# Patient Record
Sex: MALE | Race: WHITE | NOT HISPANIC OR LATINO | Employment: FULL TIME | URBAN - METROPOLITAN AREA
[De-identification: names, ages, dates, MRNs, and addresses within clinical notes are randomized per-mention and may not be internally consistent; named-entity substitution may affect disease eponyms.]

---

## 2018-11-14 ENCOUNTER — TRANSCRIBE ORDERS (OUTPATIENT)
Dept: LAB | Facility: HOSPITAL | Age: 63
End: 2018-11-14

## 2018-11-14 ENCOUNTER — LAB (OUTPATIENT)
Dept: LAB | Facility: HOSPITAL | Age: 63
End: 2018-11-14

## 2018-11-14 DIAGNOSIS — R10.10 UPPER ABDOMINAL PAIN: Primary | ICD-10-CM

## 2018-11-14 DIAGNOSIS — R19.4 CHANGE IN BOWEL HABITS: ICD-10-CM

## 2018-11-14 DIAGNOSIS — R10.10 UPPER ABDOMINAL PAIN: ICD-10-CM

## 2018-11-14 LAB — C DIFF TOX GENS STL QL NAA+PROBE: NOT DETECTED

## 2018-11-14 PROCEDURE — 87177 OVA AND PARASITES SMEARS: CPT

## 2018-11-14 PROCEDURE — 87045 FECES CULTURE AEROBIC BACT: CPT

## 2018-11-14 PROCEDURE — 87209 SMEAR COMPLEX STAIN: CPT

## 2018-11-14 PROCEDURE — 87493 C DIFF AMPLIFIED PROBE: CPT

## 2018-11-14 PROCEDURE — 87046 STOOL CULTR AEROBIC BACT EA: CPT

## 2018-11-14 PROCEDURE — 87338 HPYLORI STOOL AG IA: CPT

## 2018-11-16 LAB — BACTERIA SPEC AEROBE CULT: NORMAL

## 2018-11-17 LAB — H PYLORI AG STL QL IA: POSITIVE

## 2018-11-23 LAB
O+P SPEC MICRO: NORMAL
O+P STL TRI STN: NORMAL

## 2022-10-18 ENCOUNTER — OFFICE VISIT (OUTPATIENT)
Dept: FAMILY MEDICINE CLINIC | Facility: CLINIC | Age: 67
End: 2022-10-18

## 2022-10-18 VITALS
WEIGHT: 156.4 LBS | DIASTOLIC BLOOD PRESSURE: 80 MMHG | HEIGHT: 69 IN | BODY MASS INDEX: 23.16 KG/M2 | RESPIRATION RATE: 16 BRPM | SYSTOLIC BLOOD PRESSURE: 128 MMHG | TEMPERATURE: 97.7 F | OXYGEN SATURATION: 97 % | HEART RATE: 63 BPM

## 2022-10-18 DIAGNOSIS — Z23 NEED FOR COVID-19 VACCINE: ICD-10-CM

## 2022-10-18 DIAGNOSIS — G89.29 CHRONIC MIDLINE BACK PAIN, UNSPECIFIED BACK LOCATION: ICD-10-CM

## 2022-10-18 DIAGNOSIS — Z23 NEED FOR VACCINATION FOR H FLU TYPE B: ICD-10-CM

## 2022-10-18 DIAGNOSIS — M54.9 CHRONIC MIDLINE BACK PAIN, UNSPECIFIED BACK LOCATION: ICD-10-CM

## 2022-10-18 DIAGNOSIS — E55.9 VITAMIN D DEFICIENCY: ICD-10-CM

## 2022-10-18 DIAGNOSIS — M1A.9XX0 CHRONIC GOUT WITHOUT TOPHUS, UNSPECIFIED CAUSE, UNSPECIFIED SITE: ICD-10-CM

## 2022-10-18 DIAGNOSIS — Z00.00 ROUTINE GENERAL MEDICAL EXAMINATION AT A HEALTH CARE FACILITY: Primary | ICD-10-CM

## 2022-10-18 DIAGNOSIS — K76.89: ICD-10-CM

## 2022-10-18 DIAGNOSIS — K83.9 BILE DUCT ABNORMALITY: ICD-10-CM

## 2022-10-18 DIAGNOSIS — Z12.11 SCREENING FOR COLON CANCER: ICD-10-CM

## 2022-10-18 DIAGNOSIS — R68.89 FORGETFULNESS: ICD-10-CM

## 2022-10-18 DIAGNOSIS — E03.9 ACQUIRED HYPOTHYROIDISM: ICD-10-CM

## 2022-10-18 PROCEDURE — G0008 ADMIN INFLUENZA VIRUS VAC: HCPCS | Performed by: NURSE PRACTITIONER

## 2022-10-18 PROCEDURE — 91312 COVID-19 (PFIZER) BIVALENT BOOSTER 12+YRS: CPT | Performed by: NURSE PRACTITIONER

## 2022-10-18 PROCEDURE — 90662 IIV NO PRSV INCREASED AG IM: CPT | Performed by: NURSE PRACTITIONER

## 2022-10-18 PROCEDURE — 2014F MENTAL STATUS ASSESS: CPT | Performed by: NURSE PRACTITIONER

## 2022-10-18 PROCEDURE — 99387 INIT PM E/M NEW PAT 65+ YRS: CPT | Performed by: NURSE PRACTITIONER

## 2022-10-18 PROCEDURE — 3008F BODY MASS INDEX DOCD: CPT | Performed by: NURSE PRACTITIONER

## 2022-10-18 PROCEDURE — 0124A PR ADM SARSCOV2 30MCG/0.3ML BST: CPT | Performed by: NURSE PRACTITIONER

## 2022-10-18 RX ORDER — MV-MN/C/THEANINE/HERB NO.310 1000-200MG
1 POWDER IN PACKET (EA) ORAL NIGHTLY PRN
COMMUNITY

## 2022-10-18 RX ORDER — DOCUSATE CALCIUM 240 MG
240 CAPSULE ORAL 2 TIMES DAILY PRN
COMMUNITY

## 2022-10-18 RX ORDER — DICYCLOMINE HCL 20 MG
20 TABLET ORAL 3 TIMES DAILY
COMMUNITY
Start: 2022-08-15 | End: 2022-10-18 | Stop reason: SDUPTHER

## 2022-10-18 RX ORDER — DICYCLOMINE HCL 20 MG
20 TABLET ORAL 3 TIMES DAILY
Qty: 270 TABLET | Refills: 2 | Status: SHIPPED | OUTPATIENT
Start: 2022-10-18

## 2022-10-18 RX ORDER — PANTOPRAZOLE SODIUM 40 MG/1
40 TABLET, DELAYED RELEASE ORAL 2 TIMES DAILY
COMMUNITY
Start: 2022-08-30

## 2022-10-18 RX ORDER — ALLOPURINOL 100 MG/1
100 TABLET ORAL 2 TIMES DAILY
Qty: 180 TABLET | Refills: 2 | Status: SHIPPED | OUTPATIENT
Start: 2022-10-18

## 2022-10-18 NOTE — PROGRESS NOTES
New Patient History and Physical      Referring Physician: No ref. provider found    Chief Complaint:    Chief Complaint   Patient presents with   • Thyroid Problem   • Gout   • Irritable Bowel Syndrome     ESTABLISH CARE        History of Present Illness:   Juan Sherman is a 67 y.o. male who presents today as a new patient. Recently moved to area from [place]. Was seen by [facility] in [place], by Dr. Shyam Castillo. Patient works as a farmer. Is accompanied today by wife. Acute concerns today of Cough x 2 weeks  Productive cough, improving recently with mucinex all in one cough syrup.     Patient reports that he recently started taking Neuriva brain performance supplements with Vitamin B6, B12, and folate. States that he started to notice he was forgetting things more frequently and thinks it is just typical age-related memory loss.     Gout  Takes allopurinol to prevent flares.     Had a kidney stone in the past but that was years ago.    GERD  Well-controlled with Protonix twice daily (before breakfast and dinner).     Hypothyroidism  Levothyroxine 100mcg. Has not had thyroid level checked in several months.     Gallstones  Patient states that he was told that his bile duct had not formed properly--causing stricture.    Patient smoked when younger but quit in 1974. Denies alcohol use.     Subjective     Review of Systems     Constitutional: Negative for fever. Negative for chills, diaphoresis, fatigue and unexpected weight change.   HENT: No dysphagia; no changes to vision/hearing/smell/taste; no epistaxis  Eyes: Negative for redness and visual disturbance.   Respiratory: productive cough  Cardiovascular: Negative for chest pain and palpitations.   Gastrointestinal: Negative for abdominal distention, abdominal pain and blood in stool.   Endocrine: Negative for cold intolerance and heat intolerance.   Genitourinary: Negative for difficulty urinating, dysuria and frequency.   Musculoskeletal: Negative for  arthralgias, back pain and myalgias.   Skin: Negative for color change, rash and wound.   Neurological: Negative for syncope, weakness and headaches.   Hematological: Negative for adenopathy. Does not bruise/bleed easily.   Psychiatric/Behavioral: Negative for confusion. The patient is not nervous/anxious.     The following portions of the patient's history were reviewed and updated as appropriate: allergies, current medications, past family history, past medical history, past social history, past surgical history and problem list.    Past Medical History:   Past Medical History:   Diagnosis Date   • Disease of thyroid gland    • GERD (gastroesophageal reflux disease)    • Gout    • Hyperlipidemia    • Hypertension    • MRSA (methicillin resistant Staphylococcus aureus)     of foot 4 years ago       Past Surgical History:  Past Surgical History:   Procedure Laterality Date   • FOOT IRRIGATION, DEBRIDEMENT AND REPAIR     • HYDROCELECTOMY         Family History: family history includes Arthritis in his sister; Cancer in his sister; Diabetes in his brother, mother, and sister; Heart attack in his mother.    Social History:  reports that he quit smoking about 45 years ago. His smoking use included cigarettes. He has never used smokeless tobacco. He reports current alcohol use of about 1.0 standard drink per week. He reports that he does not use drugs.    Medications:    Current Outpatient Medications:   •  albuterol (PROVENTIL HFA;VENTOLIN HFA) 108 (90 BASE) MCG/ACT inhaler, Inhale 2 puffs every 4 (four) hours as needed for wheezing., Disp: 1 inhaler, Rfl: 0  •  allopurinol (ZYLOPRIM) 100 MG tablet, Take 1 tablet by mouth 2 (Two) Times a Day., Disp: 180 tablet, Rfl: 2  •  Cobalamin Combinations (Neuriva Plus) capsule, Take  by mouth., Disp: , Rfl:   •  dicyclomine (BENTYL) 20 MG tablet, Take 1 tablet by mouth 3 (Three) Times a Day., Disp: 270 tablet, Rfl: 2  •  docusate calcium (SURFAK) 240 MG capsule, Take 1 capsule by  "mouth 2 (Two) Times a Day., Disp: , Rfl:   •  fluticasone (FLONASE) 50 MCG/ACT nasal spray, 2 sprays into each nostril As Needed. Administer 2 sprays in each nostril for each dose. , Disp: , Rfl:   •  levothyroxine (SYNTHROID, LEVOTHROID) 88 MCG tablet, Take 100 mcg by mouth Daily., Disp: , Rfl:   •  pantoprazole (PROTONIX) 40 MG EC tablet, Take 1 tablet by mouth 2 (Two) Times a Day., Disp: , Rfl:   •  vitamin D (CHOLECALCIFEROL) 400 UNITS tablet, Take 400 Units by mouth daily., Disp: , Rfl:     Allergies:  Allergies   Allergen Reactions   • Lipitor [Atorvastatin] Other (See Comments)     MAKES HIM SORE AND STIFF      Lab Results   Component Value Date    WBC 5.71 10/18/2022    HGB 13.6 10/18/2022    HCT 39.8 10/18/2022    MCV 96.4 10/18/2022     10/18/2022     Lab Results   Component Value Date    GLUCOSE 103 (H) 10/18/2022    BUN 17 10/18/2022    CREATININE 0.95 10/18/2022    BCR 17.9 10/18/2022    K 4.5 10/18/2022    CO2 28.1 10/18/2022    CALCIUM 9.4 10/18/2022    PROTENTOTREF 6.5 10/18/2022    ALBUMIN 4.80 10/18/2022    LABIL2 2.8 10/18/2022    AST 22 10/18/2022    ALT 16 10/18/2022     Lab Results   Component Value Date    TSH 2.080 10/18/2022     Lab Results   Component Value Date    CHLPL 213 (H) 10/18/2022    TRIG 159 (H) 10/18/2022    HDL 41 10/18/2022     (H) 10/18/2022       Objective     Vital Signs:   /80   Pulse 63   Temp 97.7 °F (36.5 °C)   Resp 16   Ht 175.3 cm (69\")   Wt 70.9 kg (156 lb 6.4 oz)   SpO2 97%   BMI 23.10 kg/m²      Physical Exam:  Physical Exam  General Appearance: alert, oriented x 3, no acute distress.  Skin: warm and dry.   HEENT: Atraumatic.  pupils round and reactive to light and accommodation, oral mucosa pink and moist.  Nares patent without epistaxis.  External auditory canals are patent tympanic membranes intact.  Neck: supple, no JVD, trachea midline.  No thyromegaly  Lungs: CTA, unlabored breathing effort.  Heart: RRR, normal S1 and S2, no S3, no " rub.  Abdomen: soft, non-tender, no palpable bladder, present bowel sounds to auscultation ×4.  No guarding or rigidity.  Extremities: no clubbing, cyanosis or edema.  Good range of motion actively and passively.  Symmetric muscle strength and development  Neuro: normal speech and mental status.  Cranial nerves II through XII intact.  No anosmia. DTR 2+; proprioception intact.  No focal motor/sensory deficits.    Assessment / Plan     Assessment/Plan:   Diagnoses and all orders for this visit:    1. Routine general medical examination at a health care facility (Primary)  -     Hepatitis C antibody  -     CBC and Differential  -     Comprehensive metabolic panel  -     Lipid panel  -     Vitamin D 25 hydroxy    2. Vitamin D deficiency  -     Vitamin D 25 hydroxy    3. Acquired hypothyroidism  -     TSH  -     T4, free    4. Screening for colon cancer  -     Ambulatory Referral For Screening Colonoscopy    5. Bile back-up  -     Ambulatory Referral to Gastroenterology    6. Bile duct abnormality  -     Ambulatory Referral to Gastroenterology    7. Chronic midline back pain, unspecified back location  -     XR Spine Lumbar 2 or 3 View; Future  -     XR Spine Cervical 2 or 3 View; Future  -     XR Spine Thoracic 3 View; Future    8. Chronic gout without tophus, unspecified cause, unspecified site  -     allopurinol (ZYLOPRIM) 100 MG tablet; Take 1 tablet by mouth 2 (Two) Times a Day.  Dispense: 180 tablet; Refill: 2    9. Need for COVID-19 vaccine  -     COVID-19 Bivalent Booster (Pfizer) 12+yrs    10. Need for vaccination for H flu type B  -     Fluzone High-Dose 65+yrs ([id number])    11. Forgetfulness    Other orders  -     dicyclomine (BENTYL) 20 MG tablet; Take 1 tablet by mouth 3 (Three) Times a Day.  Dispense: 270 tablet; Refill: 2    Discussion Summary:  Discussed plan of care in detail with pt today; pt verb understanding and agrees.    Follow up:  Return in 3 months (on 1/18/2023).     Patient  Education:  Patient Instructions        Preventive Care 65 Years and Older, Male  Preventive care refers to lifestyle choices and visits with your health care provider that can promote health and wellness. Preventive care visits are also called wellness exams.  What can I expect for my preventive care visit?  Counseling  During your preventive care visit, your health care provider may ask about your:  Medical history, including:  Past medical problems.  Family medical history.  History of falls.  Current health, including:  Emotional well-being.  Home life and relationship well-being.  Sexual activity.  Memory and ability to understand (cognition).  Lifestyle, including:  Alcohol, nicotine or tobacco, and drug use.  Access to firearms.  Diet, exercise, and sleep habits.  Work and work environment.  Sunscreen use.  Safety issues such as seatbelt and bike helmet use.  Physical exam  Your health care provider will check your:  Height and weight. These may be used to calculate your BMI (body mass index). BMI is a measurement that tells if you are at a healthy weight.  Waist circumference. This measures the distance around your waistline. This measurement also tells if you are at a healthy weight and may help predict your risk of certain diseases, such as type 2 diabetes and high blood pressure.  Heart rate and blood pressure.  Body temperature.  Skin for abnormal spots.  What immunizations do I need?  Vaccines are usually given at various ages, according to a schedule. Your health care provider will recommend vaccines for you based on your age, medical history, and lifestyle or other factors, such as travel or where you work.  What tests do I need?  Screening  Your health care provider may recommend screening tests for certain conditions. This may include:  Lipid and cholesterol levels.  Diabetes screening. This is done by checking your blood sugar (glucose) after you have not eaten for a while (fasting).  Hepatitis C  test.  Hepatitis B test.  HIV (human immunodeficiency virus) test.  STI (sexually transmitted infection) testing, if you are at risk.  Lung cancer screening.  Colorectal cancer screening.  Prostate cancer screening.  Abdominal aortic aneurysm (AAA) screening. You may need this if you are a current or former smoker.  Talk with your health care provider about your test results, treatment options, and if necessary, the need for more tests.  Follow these instructions at home:  Eating and drinking  Eat a diet that includes fresh fruits and vegetables, whole grains, lean protein, and low-fat dairy products. Limit your intake of foods with high amounts of sugar, saturated fats, and salt.  Take vitamin and mineral supplements as recommended by your health care provider.  Do not drink alcohol if your health care provider tells you not to drink.  If you drink alcohol:  Limit how much you have to 0-2 drinks a day.  Know how much alcohol is in your drink. In the [place] one drink equals one 12 oz bottle of beer (355 mL), one 5 oz glass of wine (148 mL), or one 1½ oz glass of hard liquor (44 mL).  Lifestyle  Brush your teeth every morning and night with fluoride toothpaste. Floss one time each day.  Exercise for at least 30 minutes 5 or more days each week.  Do not use any products that contain nicotine or tobacco. These products include cigarettes, chewing tobacco, and vaping devices, such as e-cigarettes. If you need help quitting, ask your health care provider.  Do not use drugs.  If you are sexually active, practice safe sex. Use a condom or other form of protection to prevent STIs.  Take aspirin only as told by your health care provider. Make sure that you understand how much to take and what form to take. Work with your health care provider to find out whether it is safe and beneficial for you to take aspirin daily.  Ask your health care provider if you need to take a cholesterol-lowering medicine (statin).  Find healthy  ways to manage stress, such as:  Meditation, yoga, or listening to music.  Journaling.  Talking to a trusted person.  Spending time with friends and family.  Safety  Always wear your seat belt while driving or riding in a vehicle.  Do not drive:  If you have been drinking alcohol. Do not ride with someone who has been drinking.  When you are tired or distracted.  While texting.  If you have been using any mind-altering substances or drugs.  Wear a helmet and other protective equipment during sports activities.  If you have firearms in your house, make sure you follow all gun safety procedures.  Minimize exposure to UV radiation to reduce your risk of skin cancer.  What's next?  Visit your health care provider once a year for an annual wellness visit.  Ask your health care provider how often you should have your eyes and teeth checked.  Stay up to date on all vaccines.  This information is not intended to replace advice given to you by your health care provider. Make sure you discuss any questions you have with your health care provider.  Document Revised: 06/15/2022 Document Reviewed: 06/15/2022  [employer] Patient Education © 2022 [employer]          DIANA Hussein  10/18/2022      Please note that portions of this note may have been completed with a voice recognition program. Efforts were made to edit the dictations, but occasionally words are mistranscribed.

## 2022-10-19 LAB
25(OH)D3+25(OH)D2 SERPL-MCNC: 66.2 NG/ML (ref 30–100)
ALBUMIN SERPL-MCNC: 4.8 G/DL (ref 3.5–5.2)
ALBUMIN/GLOB SERPL: 2.8 G/DL
ALP SERPL-CCNC: 76 U/L (ref 39–117)
ALT SERPL-CCNC: 16 U/L (ref 1–41)
AST SERPL-CCNC: 22 U/L (ref 1–40)
BASOPHILS # BLD AUTO: 0.04 10*3/MM3 (ref 0–0.2)
BASOPHILS NFR BLD AUTO: 0.7 % (ref 0–1.5)
BILIRUB SERPL-MCNC: 0.5 MG/DL (ref 0–1.2)
BUN SERPL-MCNC: 17 MG/DL (ref 8–23)
BUN/CREAT SERPL: 17.9 (ref 7–25)
CALCIUM SERPL-MCNC: 9.4 MG/DL (ref 8.6–10.5)
CHLORIDE SERPL-SCNC: 106 MMOL/L (ref 98–107)
CHOLEST SERPL-MCNC: 213 MG/DL (ref 0–200)
CO2 SERPL-SCNC: 28.1 MMOL/L (ref 22–29)
CREAT SERPL-MCNC: 0.95 MG/DL (ref 0.76–1.27)
EGFRCR SERPLBLD CKD-EPI 2021: 87.7 ML/MIN/1.73
EOSINOPHIL # BLD AUTO: 0.07 10*3/MM3 (ref 0–0.4)
EOSINOPHIL NFR BLD AUTO: 1.2 % (ref 0.3–6.2)
ERYTHROCYTE [DISTWIDTH] IN BLOOD BY AUTOMATED COUNT: 12.4 % (ref 12.3–15.4)
GLOBULIN SER CALC-MCNC: 1.7 GM/DL
GLUCOSE SERPL-MCNC: 103 MG/DL (ref 65–99)
HCT VFR BLD AUTO: 39.8 % (ref 37.5–51)
HCV AB S/CO SERPL IA: <0.1 S/CO RATIO (ref 0–0.9)
HDLC SERPL-MCNC: 41 MG/DL (ref 40–60)
HGB BLD-MCNC: 13.6 G/DL (ref 13–17.7)
IMM GRANULOCYTES # BLD AUTO: 0.02 10*3/MM3 (ref 0–0.05)
IMM GRANULOCYTES NFR BLD AUTO: 0.4 % (ref 0–0.5)
LDLC SERPL CALC-MCNC: 143 MG/DL (ref 0–100)
LYMPHOCYTES # BLD AUTO: 1.32 10*3/MM3 (ref 0.7–3.1)
LYMPHOCYTES NFR BLD AUTO: 23.1 % (ref 19.6–45.3)
MCH RBC QN AUTO: 32.9 PG (ref 26.6–33)
MCHC RBC AUTO-ENTMCNC: 34.2 G/DL (ref 31.5–35.7)
MCV RBC AUTO: 96.4 FL (ref 79–97)
MONOCYTES # BLD AUTO: 0.27 10*3/MM3 (ref 0.1–0.9)
MONOCYTES NFR BLD AUTO: 4.7 % (ref 5–12)
NEUTROPHILS # BLD AUTO: 3.99 10*3/MM3 (ref 1.7–7)
NEUTROPHILS NFR BLD AUTO: 69.9 % (ref 42.7–76)
NRBC BLD AUTO-RTO: 0 /100 WBC (ref 0–0.2)
PLATELET # BLD AUTO: 211 10*3/MM3 (ref 140–450)
POTASSIUM SERPL-SCNC: 4.5 MMOL/L (ref 3.5–5.2)
PROT SERPL-MCNC: 6.5 G/DL (ref 6–8.5)
RBC # BLD AUTO: 4.13 10*6/MM3 (ref 4.14–5.8)
SODIUM SERPL-SCNC: 143 MMOL/L (ref 136–145)
T4 FREE SERPL-MCNC: 1.2 NG/DL (ref 0.93–1.7)
TRIGL SERPL-MCNC: 159 MG/DL (ref 0–150)
TSH SERPL DL<=0.005 MIU/L-ACNC: 2.08 UIU/ML (ref 0.27–4.2)
VLDLC SERPL CALC-MCNC: 29 MG/DL (ref 5–40)
WBC # BLD AUTO: 5.71 10*3/MM3 (ref 3.4–10.8)

## 2022-10-24 NOTE — PATIENT INSTRUCTIONS
Preventive Care 65 Years and Older, Male  Preventive care refers to lifestyle choices and visits with your health care provider that can promote health and wellness. Preventive care visits are also called wellness exams.  What can I expect for my preventive care visit?  Counseling  During your preventive care visit, your health care provider may ask about your:  Medical history, including:  Past medical problems.  Family medical history.  History of falls.  Current health, including:  Emotional well-being.  Home life and relationship well-being.  Sexual activity.  Memory and ability to understand (cognition).  Lifestyle, including:  Alcohol, nicotine or tobacco, and drug use.  Access to firearms.  Diet, exercise, and sleep habits.  Work and work environment.  Sunscreen use.  Safety issues such as seatbelt and bike helmet use.  Physical exam  Your health care provider will check your:  Height and weight. These may be used to calculate your BMI (body mass index). BMI is a measurement that tells if you are at a healthy weight.  Waist circumference. This measures the distance around your waistline. This measurement also tells if you are at a healthy weight and may help predict your risk of certain diseases, such as type 2 diabetes and high blood pressure.  Heart rate and blood pressure.  Body temperature.  Skin for abnormal spots.  What immunizations do I need?  Vaccines are usually given at various ages, according to a schedule. Your health care provider will recommend vaccines for you based on your age, medical history, and lifestyle or other factors, such as travel or where you work.  What tests do I need?  Screening  Your health care provider may recommend screening tests for certain conditions. This may include:  Lipid and cholesterol levels.  Diabetes screening. This is done by checking your blood sugar (glucose) after you have not eaten for a while (fasting).  Hepatitis C test.  Hepatitis B test.  HIV (human  immunodeficiency virus) test.  STI (sexually transmitted infection) testing, if you are at risk.  Lung cancer screening.  Colorectal cancer screening.  Prostate cancer screening.  Abdominal aortic aneurysm (AAA) screening. You may need this if you are a current or former smoker.  Talk with your health care provider about your test results, treatment options, and if necessary, the need for more tests.  Follow these instructions at home:  Eating and drinking  Eat a diet that includes fresh fruits and vegetables, whole grains, lean protein, and low-fat dairy products. Limit your intake of foods with high amounts of sugar, saturated fats, and salt.  Take vitamin and mineral supplements as recommended by your health care provider.  Do not drink alcohol if your health care provider tells you not to drink.  If you drink alcohol:  Limit how much you have to 0-2 drinks a day.  Know how much alcohol is in your drink. In the [place] one drink equals one 12 oz bottle of beer (355 mL), one 5 oz glass of wine (148 mL), or one 1½ oz glass of hard liquor (44 mL).  Lifestyle  Brush your teeth every morning and night with fluoride toothpaste. Floss one time each day.  Exercise for at least 30 minutes 5 or more days each week.  Do not use any products that contain nicotine or tobacco. These products include cigarettes, chewing tobacco, and vaping devices, such as e-cigarettes. If you need help quitting, ask your health care provider.  Do not use drugs.  If you are sexually active, practice safe sex. Use a condom or other form of protection to prevent STIs.  Take aspirin only as told by your health care provider. Make sure that you understand how much to take and what form to take. Work with your health care provider to find out whether it is safe and beneficial for you to take aspirin daily.  Ask your health care provider if you need to take a cholesterol-lowering medicine (statin).  Find healthy ways to manage stress, such  as:  Meditation, yoga, or listening to music.  Journaling.  Talking to a trusted person.  Spending time with friends and family.  Safety  Always wear your seat belt while driving or riding in a vehicle.  Do not drive:  If you have been drinking alcohol. Do not ride with someone who has been drinking.  When you are tired or distracted.  While texting.  If you have been using any mind-altering substances or drugs.  Wear a helmet and other protective equipment during sports activities.  If you have firearms in your house, make sure you follow all gun safety procedures.  Minimize exposure to UV radiation to reduce your risk of skin cancer.  What's next?  Visit your health care provider once a year for an annual wellness visit.  Ask your health care provider how often you should have your eyes and teeth checked.  Stay up to date on all vaccines.  This information is not intended to replace advice given to you by your health care provider. Make sure you discuss any questions you have with your health care provider.  Document Revised: 06/15/2022 Document Reviewed: 06/15/2022  [employer] Patient Education © 2022 [employer]

## 2022-11-09 ENCOUNTER — ANCILLARY ORDERS (OUTPATIENT)
Dept: FAMILY MEDICINE CLINIC | Facility: CLINIC | Age: 67
End: 2022-11-09

## 2022-11-09 DIAGNOSIS — M51.34 DEGENERATIVE DISC DISEASE, THORACIC: ICD-10-CM

## 2022-11-09 DIAGNOSIS — G89.29 CHRONIC MIDLINE BACK PAIN, UNSPECIFIED BACK LOCATION: ICD-10-CM

## 2022-11-09 DIAGNOSIS — M54.9 CHRONIC MIDLINE BACK PAIN, UNSPECIFIED BACK LOCATION: ICD-10-CM

## 2022-11-09 DIAGNOSIS — M50.30 DEGENERATIVE DISC DISEASE, CERVICAL: Primary | ICD-10-CM

## 2022-11-09 DIAGNOSIS — M51.36 DEGENERATIVE DISC DISEASE, LUMBAR: ICD-10-CM

## 2023-01-18 ENCOUNTER — LAB (OUTPATIENT)
Dept: LAB | Facility: HOSPITAL | Age: 68
End: 2023-01-18
Payer: MEDICARE

## 2023-01-18 ENCOUNTER — OFFICE VISIT (OUTPATIENT)
Dept: GASTROENTEROLOGY | Facility: CLINIC | Age: 68
End: 2023-01-18
Payer: MEDICARE

## 2023-01-18 VITALS
OXYGEN SATURATION: 99 % | TEMPERATURE: 97.3 F | BODY MASS INDEX: 23.7 KG/M2 | HEIGHT: 69 IN | WEIGHT: 160 LBS | SYSTOLIC BLOOD PRESSURE: 138 MMHG | HEART RATE: 63 BPM | DIASTOLIC BLOOD PRESSURE: 80 MMHG

## 2023-01-18 DIAGNOSIS — K21.9 GASTROESOPHAGEAL REFLUX DISEASE, UNSPECIFIED WHETHER ESOPHAGITIS PRESENT: Chronic | ICD-10-CM

## 2023-01-18 DIAGNOSIS — R10.11 RIGHT UPPER QUADRANT ABDOMINAL PAIN: Chronic | ICD-10-CM

## 2023-01-18 DIAGNOSIS — K83.9 BILE DUCT ABNORMALITY: Chronic | ICD-10-CM

## 2023-01-18 DIAGNOSIS — Z12.11 ENCOUNTER FOR SCREENING FOR MALIGNANT NEOPLASM OF COLON: ICD-10-CM

## 2023-01-18 DIAGNOSIS — R10.11 RIGHT UPPER QUADRANT ABDOMINAL PAIN: Primary | Chronic | ICD-10-CM

## 2023-01-18 DIAGNOSIS — Z80.0 FAMILY HISTORY OF COLON CANCER: ICD-10-CM

## 2023-01-18 DIAGNOSIS — K59.00 CONSTIPATION, UNSPECIFIED CONSTIPATION TYPE: Chronic | ICD-10-CM

## 2023-01-18 LAB
ALBUMIN SERPL-MCNC: 4.5 G/DL (ref 3.5–5.2)
ALBUMIN/GLOB SERPL: 1.6 G/DL
ALP SERPL-CCNC: 61 U/L (ref 39–117)
ALT SERPL W P-5'-P-CCNC: 19 U/L (ref 1–41)
ANION GAP SERPL CALCULATED.3IONS-SCNC: 8.9 MMOL/L (ref 5–15)
AST SERPL-CCNC: 20 U/L (ref 1–40)
BILIRUB SERPL-MCNC: 0.5 MG/DL (ref 0–1.2)
BUN SERPL-MCNC: 31 MG/DL (ref 8–23)
BUN/CREAT SERPL: 22.5 (ref 7–25)
CALCIUM SPEC-SCNC: 9.7 MG/DL (ref 8.6–10.5)
CHLORIDE SERPL-SCNC: 106 MMOL/L (ref 98–107)
CO2 SERPL-SCNC: 28.1 MMOL/L (ref 22–29)
CREAT SERPL-MCNC: 1.38 MG/DL (ref 0.76–1.27)
DEPRECATED RDW RBC AUTO: 42.6 FL (ref 37–54)
EGFRCR SERPLBLD CKD-EPI 2021: 56 ML/MIN/1.73
ERYTHROCYTE [DISTWIDTH] IN BLOOD BY AUTOMATED COUNT: 12.3 % (ref 12.3–15.4)
GLOBULIN UR ELPH-MCNC: 2.8 GM/DL
GLUCOSE SERPL-MCNC: 95 MG/DL (ref 65–99)
HCT VFR BLD AUTO: 41.6 % (ref 37.5–51)
HGB BLD-MCNC: 14.3 G/DL (ref 13–17.7)
IGA1 MFR SER: 146 MG/DL (ref 70–400)
LIPASE SERPL-CCNC: 22 U/L (ref 13–60)
MCH RBC QN AUTO: 32.6 PG (ref 26.6–33)
MCHC RBC AUTO-ENTMCNC: 34.4 G/DL (ref 31.5–35.7)
MCV RBC AUTO: 95 FL (ref 79–97)
PLATELET # BLD AUTO: 196 10*3/MM3 (ref 140–450)
PMV BLD AUTO: 9.2 FL (ref 6–12)
POTASSIUM SERPL-SCNC: 4.9 MMOL/L (ref 3.5–5.2)
PROT SERPL-MCNC: 7.3 G/DL (ref 6–8.5)
RBC # BLD AUTO: 4.38 10*6/MM3 (ref 4.14–5.8)
SODIUM SERPL-SCNC: 143 MMOL/L (ref 136–145)
WBC NRBC COR # BLD: 4.34 10*3/MM3 (ref 3.4–10.8)

## 2023-01-18 PROCEDURE — 99214 OFFICE O/P EST MOD 30 MIN: CPT | Performed by: NURSE PRACTITIONER

## 2023-01-18 PROCEDURE — 82784 ASSAY IGA/IGD/IGG/IGM EACH: CPT

## 2023-01-18 PROCEDURE — 86364 TISS TRNSGLTMNASE EA IG CLAS: CPT

## 2023-01-18 PROCEDURE — 85027 COMPLETE CBC AUTOMATED: CPT

## 2023-01-18 PROCEDURE — 36415 COLL VENOUS BLD VENIPUNCTURE: CPT

## 2023-01-18 PROCEDURE — 83690 ASSAY OF LIPASE: CPT

## 2023-01-18 PROCEDURE — 80053 COMPREHEN METABOLIC PANEL: CPT

## 2023-01-18 RX ORDER — MELATONIN 10 MG
10 CAPSULE ORAL NIGHTLY
COMMUNITY

## 2023-01-18 RX ORDER — SODIUM, POTASSIUM,MAG SULFATES 17.5-3.13G
SOLUTION, RECONSTITUTED, ORAL ORAL
Qty: 177 ML | Refills: 0 | Status: SHIPPED | OUTPATIENT
Start: 2023-01-18 | End: 2023-01-23

## 2023-01-18 RX ORDER — SODIUM CHLORIDE 9 MG/ML
70 INJECTION, SOLUTION INTRAVENOUS CONTINUOUS PRN
Status: CANCELLED | OUTPATIENT
Start: 2023-01-18

## 2023-01-18 RX ORDER — MULTIPLE VITAMINS W/ MINERALS TAB 9MG-400MCG
1 TAB ORAL DAILY
COMMUNITY

## 2023-01-18 RX ORDER — METAXALONE 800 MG/1
800 TABLET ORAL 3 TIMES DAILY PRN
COMMUNITY

## 2023-01-18 NOTE — PROGRESS NOTES
"     New Patient Consult      Date: 2023   Patient Name: Juan Sherman  MRN: [id number]  : 1955     Primary Care Provider: Mami Cheng APRN    Chief Complaint   Patient presents with   • Abdominal Pain     History of Present Illness: Juan Sherman is a 67 y.o. male who is here today to establish care with gastroenterology for abdominal pain.     He has been having pain in the RUQ for the past 2 years. He had an ERCP in  at [facility] and was told his bile duct \"did not form correctly\". He was advised to follow up in 6 months, but he did not go back. He has swelling in the RUQ that comes and goes. He has the pain daily that comes and goes. The pain occurs if he over eats. If he lays on his right side, the pain is worse. Denies nausea or vomiting. Denies melena.     He has a history of reflux for over 30 years. He is currently taking Pantoprazole 40 mg twice a day for a couple of years with reasonable control. If he goes down to once a day, his reflux is severe. He denies difficulty swallowing.     He has a history of constipation off and on for several years that is reasonably controlled with stool softeners. He usually has 1 soft bowel movement per day. Denies rectal bleeding.     His last colonoscopy was about 10 years ago per patient. His last EGD was around . He has a family history of colon cancer in his brother, diagnosed in his 50-60's. He had 2-3 uncles with colon cancer, unknown age at diagnosis.    Subjective      Past Medical History:   Diagnosis Date   • Disease of thyroid gland    • GERD (gastroesophageal reflux disease)    • Gout    • Hyperlipidemia    • Hypertension    • MRSA (methicillin resistant Staphylococcus aureus)     of foot 4 years ago     Past Surgical History:   Procedure Laterality Date   • CHOLECYSTECTOMY     • COLONOSCOPY      over 10 years ago   • ERCP     • FOOT IRRIGATION, DEBRIDEMENT AND REPAIR     • HYDROCELECTOMY     • UPPER GASTROINTESTINAL " ENDOSCOPY       Family History   Problem Relation Age of Onset   • Diabetes Mother    • Heart attack Mother    • Diabetes Sister    • Cancer Sister    • Arthritis Sister    • Diabetes Brother    • Colon cancer Brother         diagnosed in his 50-60's   • Colon cancer Paternal Uncle         2-3 uncles     Social History     Socioeconomic History   • Marital status:    Tobacco Use   • Smoking status: Former     Packs/day: 0.50     Years: 3.00     Pack years: 1.50     Types: Cigarettes     Quit date: 1976     Years since quittin.1   • Smokeless tobacco: Never   Vaping Use   • Vaping Use: Never used   Substance and Sexual Activity   • Alcohol use: Not Currently     Alcohol/week: 1.0 standard drink     Types: 1 Cans of beer per week     Comment: occasionally   • Drug use: No   • Sexual activity: Yes     Partners: Female       Current Outpatient Medications:   •  allopurinol (ZYLOPRIM) 100 MG tablet, Take 1 tablet by mouth 2 (Two) Times a Day., Disp: 180 tablet, Rfl: 2  •  Cobalamin Combinations (Neuriva Plus) capsule, Take  by mouth., Disp: , Rfl:   •  Coenzyme Q10-Vitamin E (QUNOL ULTRA COQ10 PO), Take 100 mg by mouth Daily., Disp: , Rfl:   •  diclofenac (VOLTAREN) 50 MG EC tablet, Take 50 mg by mouth 2 (Two) Times a Day As Needed., Disp: , Rfl:   •  dicyclomine (BENTYL) 20 MG tablet, Take 1 tablet by mouth 3 (Three) Times a Day., Disp: 270 tablet, Rfl: 2  •  docusate calcium (SURFAK) 240 MG capsule, Take 1 capsule by mouth 2 (Two) Times a Day., Disp: , Rfl:   •  fluticasone (FLONASE) 50 MCG/ACT nasal spray, 2 sprays into each nostril As Needed. Administer 2 sprays in each nostril for each dose. , Disp: , Rfl:   •  levothyroxine (SYNTHROID, LEVOTHROID) 88 MCG tablet, Take 100 mcg by mouth Daily., Disp: , Rfl:   •  Melatonin 10 MG capsule, Take 10 mg by mouth Every Night., Disp: , Rfl:   •  metaxalone (SKELAXIN) 800 MG tablet, Take 800 mg by mouth 3 (Three) Times a Day As Needed for Muscle Spasms.,  Disp: , Rfl:   •  multivitamin with minerals (MULTIVITAMIN ADULT PO), Take 1 tablet by mouth Daily., Disp: , Rfl:   •  pantoprazole (PROTONIX) 40 MG EC tablet, Take 1 tablet by mouth 2 (Two) Times a Day., Disp: , Rfl:   •  sodium-potassium-magnesium sulfates (Suprep Bowel Prep Kit) 17.5-3.13-1.6 GM/177ML solution oral solution, Use as directed for colonoscopy prep. Patient has instructions., Disp: 177 mL, Rfl: 0  •  vitamin D (CHOLECALCIFEROL) 400 UNITS tablet, Take 400 Units by mouth daily., Disp: , Rfl:    Allergies   Allergen Reactions   • Lipitor [Atorvastatin] Other (See Comments)     MAKES HIM SORE AND STIFF      The following portions of the patient's history were reviewed and updated as appropriate: allergies, current medications, past family history, past medical history, past social history, past surgical history and problem list.    Objective     Physical Exam  Vitals and nursing note reviewed.   Constitutional:       General: He is not in acute distress.     Appearance: Normal appearance. He is well-developed.   HENT:      Head: Normocephalic and atraumatic.      Mouth/Throat:      Mouth: Mucous membranes are not pale, not dry and not cyanotic.   Eyes:      General: Lids are normal.   Neck:      Trachea: Trachea normal.   Cardiovascular:      Rate and Rhythm: Normal rate.   Pulmonary:      Effort: Pulmonary effort is normal. No respiratory distress.      Breath sounds: Normal breath sounds.   Abdominal:      General: Bowel sounds are normal.      Palpations: Abdomen is soft. There is no mass.      Tenderness: There is no abdominal tenderness.      Hernia: No hernia is present.   Skin:     General: Skin is warm and dry.   Neurological:      Mental Status: He is alert and oriented to person, place, and time.   Psychiatric:         Mood and Affect: Mood normal.         Speech: Speech normal.         Behavior: Behavior normal. Behavior is cooperative.       Vitals:    01/18/23 0843   BP: 138/80   Pulse: 63  "  Temp: 97.3 °F (36.3 °C)   TempSrc: Infrared   SpO2: 99%   Weight: 72.6 kg (160 lb)   Height: 175.3 cm (69\")     Body mass index is 23.63 kg/m².     Results Review:   I have reviewed the patient's new clinical and imaging results.    No visits with results within 90 Day(s) from this visit.   Latest known visit with results is:   Office Visit on 10/18/2022   Component Date Value Ref Range Status   • Hep C Virus Ab 10/18/2022 <0.1  0.0 - 0.9 s/co ratio Final    Comment:                                   Negative:     < 0.8                               Indeterminate: 0.8 - 0.9                                    Positive:     > 0.9   HCV antibody alone does not differentiate between   previous resolved infection and active infection.   The CDC and current clinical guidelines recommend   that a positive HCV antibody result be followed up   with an HCV RNA test to support the diagnosis of   acute HCV infection. [facility] offers Hepatitis C   Virus (HCV) RNA, Diagnosis, VIOLA (542224) and   Hepatitis C Virus (HCV) Antibody with reflex to   Quantitative Real-time PCR (926481).     • WBC 10/18/2022 5.71  3.40 - 10.80 10*3/mm3 Final   • RBC 10/18/2022 4.13 (L)  4.14 - 5.80 10*6/mm3 Final   • Hemoglobin 10/18/2022 13.6  13.0 - 17.7 g/dL Final   • Hematocrit 10/18/2022 39.8  37.5 - 51.0 % Final   • MCV 10/18/2022 96.4  79.0 - 97.0 fL Final   • MCH 10/18/2022 32.9  26.6 - 33.0 pg Final   • MCHC 10/18/2022 34.2  31.5 - 35.7 g/dL Final   • RDW 10/18/2022 12.4  12.3 - 15.4 % Final   • Platelets 10/18/2022 211  140 - 450 10*3/mm3 Final   • Neutrophil Rel % 10/18/2022 69.9  42.7 - 76.0 % Final   • Lymphocyte Rel % 10/18/2022 23.1  19.6 - 45.3 % Final   • Monocyte Rel % 10/18/2022 4.7 (L)  5.0 - 12.0 % Final   • Eosinophil Rel % 10/18/2022 1.2  0.3 - 6.2 % Final   • Basophil Rel % 10/18/2022 0.7  0.0 - 1.5 % Final   • Neutrophils Absolute 10/18/2022 3.99  1.70 - 7.00 10*3/mm3 Final   • Lymphocytes Absolute 10/18/2022 1.32  0.70 - 3.10 " 10*3/mm3 Final   • Monocytes Absolute 10/18/2022 0.27  0.10 - 0.90 10*3/mm3 Final   • Eosinophils Absolute 10/18/2022 0.07  0.00 - 0.40 10*3/mm3 Final   • Basophils Absolute 10/18/2022 0.04  0.00 - 0.20 10*3/mm3 Final   • Immature Granulocyte Rel % 10/18/2022 0.4  0.0 - 0.5 % Final   • Immature Grans Absolute 10/18/2022 0.02  0.00 - 0.05 10*3/mm3 Final   • nRBC 10/18/2022 0.0  0.0 - 0.2 /100 WBC Final   • Glucose 10/18/2022 103 (H)  65 - 99 mg/dL Final   • BUN 10/18/2022 17  8 - 23 mg/dL Final   • Creatinine 10/18/2022 0.95  0.76 - 1.27 mg/dL Final   • EGFR Result 10/18/2022 87.7  >60.0 mL/min/1.73 Final    Comment: National Kidney Foundation and American Society of  Nephrology (ASN) Task Force recommended calculation based  on the Chronic Kidney Disease Epidemiology Collaboration  (CKD-EPI) equation refit without adjustment for race.  GFR Normal >60  Chronic Kidney Disease <60  Kidney Failure <15     • BUN/Creatinine Ratio 10/18/2022 17.9  7.0 - 25.0 Final   • Sodium 10/18/2022 143  136 - 145 mmol/L Final   • Potassium 10/18/2022 4.5  3.5 - 5.2 mmol/L Final   • Chloride 10/18/2022 106  98 - 107 mmol/L Final   • Total CO2 10/18/2022 28.1  22.0 - 29.0 mmol/L Final   • Calcium 10/18/2022 9.4  8.6 - 10.5 mg/dL Final   • Total Protein 10/18/2022 6.5  6.0 - 8.5 g/dL Final   • Albumin 10/18/2022 4.80  3.50 - 5.20 g/dL Final   • Globulin 10/18/2022 1.7  gm/dL Final   • A/G Ratio 10/18/2022 2.8  g/dL Final   • Total Bilirubin 10/18/2022 0.5  0.0 - 1.2 mg/dL Final   • Alkaline Phosphatase 10/18/2022 76  39 - 117 U/L Final   • AST (SGOT) 10/18/2022 22  1 - 40 U/L Final   • ALT (SGPT) 10/18/2022 16  1 - 41 U/L Final   • Total Cholesterol 10/18/2022 213 (H)  0 - 200 mg/dL Final    Comment: Cholesterol Reference Ranges  (U.S. Department of Health and Human Services ATP III  Classifications)  Desirable          <200 mg/dL  Borderline High    200-239 mg/dL  High Risk          >240 mg/dL  Triglyceride Reference Ranges  (U.S.  Department of Health and Human Services ATP III  Classifications)  Normal           <150 mg/dL  Borderline High  150-199 mg/dL  High             200-499 mg/dL  Very High        >500 mg/dL  HDL Reference Ranges  (U.S. Department of Health and Human Services ATP III  Classifications)  Low     <40 mg/dl (major risk factor for CHD)  High    >60 mg/dl ('negative' risk factor for CHD)  LDL Reference Ranges  (U.S. Department of Health and Human Services ATP III  Classifications)  Optimal          <100 mg/dL  Near Optimal     100-129 mg/dL  Borderline High  130-159 mg/dL  High             160-189 mg/dL  Very High        >189 mg/dL     • Triglycerides 10/18/2022 159 (H)  0 - 150 mg/dL Final   • HDL Cholesterol 10/18/2022 41  40 - 60 mg/dL Final   • VLDL Cholesterol Geo 10/18/2022 29  5 - 40 mg/dL Final   • LDL Chol Calc (NIH) 10/18/2022 143 (H)  0 - 100 mg/dL Final   • TSH 10/18/2022 2.080  0.270 - 4.200 uIU/mL Final   • Free T4 10/18/2022 1.20  0.93 - 1.70 ng/dL Final    Results may be falsely increased if patient taking Biotin.   • 25 Hydroxy, Vitamin D 10/18/2022 66.2  30.0 - 100.0 ng/ml Final    Comment: Results may be falsely increased if patient taking Biotin.  Reference Range for Total Vitamin D 25(OH)  Deficiency <20.0 ng/mL  Insufficiency 21-29 ng/mL  Sufficiency  ng/mL  Toxicity >100 ng/ml        EGD dated 8/25/2015 at [facility] per Care Everywhere  -Hiatal hernia.  Biopsy.  -Normal stomach.  Biopsied.  -Multiple gastric polyps.  Biopsied.  -Normal examined duodenum.  Small intestine duodenum biopsy with no pathologic abnormality, no evidence of celiac disease, no organisms.  Stomach biopsy with findings suggestive of previous erosion, no H. pylori organisms identified.  Esophagus biopsy with no pathologic abnormalities.  Stomach polyp biopsy with fundic gland polyps.    ERCP Dated 2/17/2021 per Care Everywhere  - Procedure report not available.  - Bile duct mid biopsy with benign ductal epithelium  with lamina propria fibrosis and mild chronic inflammatory infiltrate.  No evidence of malignancy.    Assessment / Plan      1. Right upper quadrant abdominal pain  2. Bile duct abnormality  3. Gastroesophageal reflux disease, unspecified whether esophagitis present  Patient has a history of right upper quadrant pain for the past 2 years that has not improved.  He has the pain daily that comes and goes.  Pain is worse if he overeats or lays on his right side.  He had an ERCP in 2021 at [facility] and was told his bile duct did not form correctly.  He has a history of reflux for over 30 years and is taking pantoprazole 40 mg twice a day with reasonable control.  If he goes down to once a day, his reflux is severe.  Denies difficulty swallowing. Abdomen nontender to palpation.   EGD in 2015 with multiple gastric polyps noted, biopsy with fundic gland polyps.  ERCP dated 2/17/2021 with procedure report not available.  Pathology results show bile duct mid biopsy with benign ductal epithelium and lamina propria fibrosis and mild chronic inflammatory infiltrate.  No evidence of malignancy. No EGD report available for 2021. No abdominal imaging available to interpret. Labs in October 2022 with normal liver enzymes, no anemia.  Hepatitis C antibody negative.  Antireflux measures.  We will try to decrease to Pantoprazole 40 mg once a day, may take additional dose in the evening if needed.  We will obtain copies of records from [facility].  Lab  CTAP to rule out solid organ pathology.  Possible EGD in the future.    - CT Abdomen Pelvis With Contrast; Future  - CBC (No Diff); Future  - Comprehensive Metabolic Panel; Future  - Lipase; Future  - IgA; Future  - Tissue Transglutaminase, IgA; Future    4. Constipation, unspecified constipation type  He has a history of constipation off and on for several years that is reasonably controlled with stool softeners.  Continue same.  Denies rectal bleeding. Labs in October 2022.  With  unremarkable basic labs, TSH normal.  High-fiber, low-fat diet with liberal water intake.  Metamucil 1 packet daily.    - CT Abdomen Pelvis With Contrast; Future    5. Encounter for screening for malignant neoplasm of colon  6. Family history of colon cancer  His last colonoscopy was about 10 years ago per patient.  There is a family history of colon cancer in his brother diagnosed in his 50s or 60s, he also had 2 uncles with colon cancer, unknown age at diagnosis.  Colonoscopy for colon cancer screening.    - Case Request  - sodium-potassium-magnesium sulfates (Suprep Bowel Prep Kit) 17.5-3.13-1.6 GM/177ML solution oral solution; Use as directed for colonoscopy prep. Patient has instructions.  Dispense: 177 mL; Refill: 0    Patient Instructions   1. Antireflux measures: Avoid fried, fatty foods, alcohol, chocolate, coffee, tea,  soft drinks, peppermint and spearmint, spicy foods, tomatoes and tomato based foods, onion based foods, and smoking.  2. Other antireflux measures include weight reduction if overweight, avoiding tight clothing around the abdomen, elevating the head of the bed 6 inches with blocks under the head board, and don't drink or eat before going to bed and avoid lying down immediately after meals.  3. Pantoprazole 40 mg 1 po daily in the am 30 minutes before breakfast. Only take evening dose if needed.  4. Recommended to take Levothyroxine first in the am upon waking, wait 30 minutes, then take Pantoprazole 40 mg, wait 30 minutes, then eat breakfast and take other medications.   5. The patient should eat 4-5 very small meals throughout the day. Avoid large meals.  6. It is recommended to eat a softer diet. Meats are best consumed ground. Fruits and vegetables are best consumed cooked or steamed and then mashed.    7. Low fiber, low fat diet with liberal water intake.   8. Metamucil 1 packet daily.   9. Continue stool softeners daily as needed for constipation.   10. Will contact [facility] to get copies of  procedures.  11. Colonoscopy: The indications, technique, alternatives and potential risk and complications were discussed with the patient including but not limited to bleeding, perforations, missing lesions and anesthetic complications. The patient understands and wishes to proceed with the procedure and has given their verbal consent. Written patient education information was given to the patient.   12. The patient will call if they have further questions before procedure.       Ivis Bedoya, APRN  1/18/2023    Please note that portions of this note may have been completed with a voice recognition program.

## 2023-01-18 NOTE — PATIENT INSTRUCTIONS
Antireflux measures: Avoid fried, fatty foods, alcohol, chocolate, coffee, tea,  soft drinks, peppermint and spearmint, spicy foods, tomatoes and tomato based foods, onion based foods, and smoking.  Other antireflux measures include weight reduction if overweight, avoiding tight clothing around the abdomen, elevating the head of the bed 6 inches with blocks under the head board, and don't drink or eat before going to bed and avoid lying down immediately after meals.  Pantoprazole 40 mg 1 po daily in the am 30 minutes before breakfast. Only take evening dose if needed.  Recommended to take Levothyroxine first in the am upon waking, wait 30 minutes, then take Pantoprazole 40 mg, wait 30 minutes, then eat breakfast and take other medications.   The patient should eat 4-5 very small meals throughout the day. Avoid large meals.  It is recommended to eat a softer diet. Meats are best consumed ground. Fruits and vegetables are best consumed cooked or steamed and then mashed.    Low fiber, low fat diet with liberal water intake.   Metamucil 1 packet daily.   Continue stool softeners daily as needed for constipation.   Will contact  to get copies of procedures.  Colonoscopy: The indications, technique, alternatives and potential risk and complications were discussed with the patient including but not limited to bleeding, perforations, missing lesions and anesthetic complications. The patient understands and wishes to proceed with the procedure and has given their verbal consent. Written patient education information was given to the patient.   The patient will call if they have further questions before procedure.

## 2023-01-19 ENCOUNTER — OFFICE VISIT (OUTPATIENT)
Dept: FAMILY MEDICINE CLINIC | Facility: CLINIC | Age: 68
End: 2023-01-19
Payer: MEDICARE

## 2023-01-19 VITALS
BODY MASS INDEX: 22.33 KG/M2 | DIASTOLIC BLOOD PRESSURE: 82 MMHG | TEMPERATURE: 97.3 F | SYSTOLIC BLOOD PRESSURE: 128 MMHG | HEIGHT: 69 IN | OXYGEN SATURATION: 99 % | WEIGHT: 150.8 LBS | HEART RATE: 44 BPM | RESPIRATION RATE: 16 BRPM

## 2023-01-19 DIAGNOSIS — R68.89 FORGETFULNESS: ICD-10-CM

## 2023-01-19 DIAGNOSIS — N49.2 NODULE OF SCROTUM: ICD-10-CM

## 2023-01-19 DIAGNOSIS — G44.86 CERVICOGENIC HEADACHE: ICD-10-CM

## 2023-01-19 DIAGNOSIS — K21.9 GASTROESOPHAGEAL REFLUX DISEASE, UNSPECIFIED WHETHER ESOPHAGITIS PRESENT: ICD-10-CM

## 2023-01-19 DIAGNOSIS — M50.90 CERVICAL NECK PAIN WITH EVIDENCE OF DISC DISEASE: Primary | ICD-10-CM

## 2023-01-19 LAB — TTG IGA SER-ACNC: <2 U/ML (ref 0–3)

## 2023-01-19 PROCEDURE — 99214 OFFICE O/P EST MOD 30 MIN: CPT | Performed by: NURSE PRACTITIONER

## 2023-01-19 NOTE — PROGRESS NOTES
"                      Established Patient        Chief Complaint:   Chief Complaint   Patient presents with   • Med Refill     3 MONTH FOLLOW UP          History of Present Illness:    Juan Sherman is a 67 y.o. male who presents today for follow up of chronic medical conditions. Patient was last seen in clinic on 10/18/22. Patient reports that he still has some forgetfulness, had recently started taking Nebuvia at last appointment. Reports that he is still taking it daily. Patient currently taking pantoprazole 40mg once daily. Patient was previously taking BID dosing and was seen by GI yesterday who encouraged him to decrease to once daily if able to tolerate and stated that he could take a second dose in the evening if needed. Patient to have colonoscopy in March. History of neck and back pain. Patient reports that he had an MRI and PT was discontinued. Patient states that MRI showed that a few of his cervical discs were \"gone\" and that PT and therapy would no longer be beneficial. Patient started on Metaxalone 800mg TID PRN. Patient reports he is taking it morning and night. Patient was referred to pain management clinic in [place]. Patient concerned regarding a small nodule about the size of \"bb\" in center of scrotum. Patient reports that he felt it last night. Denies pain, swelling. Has a history of hydrocele of left testicle.     Subjective     The following portions of the patient's history were reviewed and updated as appropriate: allergies, current medications, past family history, past medical history, past social history, past surgical history and problem list.    ALLERGIES  Allergies   Allergen Reactions   • Lipitor [Atorvastatin] Other (See Comments)     MAKES HIM SORE AND STIFF        ROS  Review of Systems   Constitutional: Negative for fatigue and fever.   Genitourinary: Negative for testicular pain.        Nodule of scrotum   Musculoskeletal: Positive for back pain and neck pain.   All other " "systems reviewed and are negative.      Objective     Vital Signs:   /82   Pulse (!) 44   Temp 97.3 °F (36.3 °C)   Resp 16   Ht 175.3 cm (69\")   Wt 68.4 kg (150 lb 12.8 oz)   SpO2 99%   BMI 22.27 kg/m²     Physical Exam   Physical Exam  Vitals and nursing note reviewed.   HENT:      Head: Normocephalic.      Nose: Nose normal.      Mouth/Throat:      Lips: Pink.   Eyes:      General: Lids are normal.      Conjunctiva/sclera: Conjunctivae normal.      Pupils: Pupils are equal, round, and reactive to light.   Cardiovascular:      Rate and Rhythm: Normal rate.      Heart sounds: Normal heart sounds.   Pulmonary:      Effort: Pulmonary effort is normal.      Breath sounds: Normal breath sounds.   Abdominal:      General: Bowel sounds are normal.   Musculoskeletal:         General: Normal range of motion.   Skin:     General: Skin is warm and dry.   Neurological:      Mental Status: He is alert and oriented to person, place, and time.      Gait: Gait is intact.   Psychiatric:         Attention and Perception: Attention normal.         Mood and Affect: Mood and affect normal.         Speech: Speech normal.         Behavior: Behavior normal. Behavior is cooperative.       Assessment and Plan      Assessment/Plan:   Diagnoses and all orders for this visit:    1. Cervical neck pain with evidence of disc disease (Primary)    2. Gastroesophageal reflux disease, unspecified whether esophagitis present    3. Forgetfulness    4. Cervicogenic headache    5. Nodule of scrotum  -     US scrotum and testicles; Future      Discussion Summary:  Discussed plan of care in detail with pt today; pt verb understanding and agrees.    Follow up:  Return in about 6 months (around 7/19/2023).     Patient Education:  Patient Instructions   --will request MRI results   --patient to follow up with pain management clinic      DIANA Hussein  01/19/23  09:39 EST          Please note that portions of this note may have been " completed with a voice recognition program.

## 2023-01-23 ENCOUNTER — TELEPHONE (OUTPATIENT)
Dept: FAMILY MEDICINE CLINIC | Facility: CLINIC | Age: 68
End: 2023-01-23
Payer: MEDICARE

## 2023-01-23 DIAGNOSIS — Z12.11 ENCOUNTER FOR SCREENING FOR MALIGNANT NEOPLASM OF COLON: Primary | ICD-10-CM

## 2023-01-23 DIAGNOSIS — Z80.0 FAMILY HISTORY OF COLON CANCER: ICD-10-CM

## 2023-01-23 RX ORDER — SODIUM PICOSULFATE, MAGNESIUM OXIDE, AND ANHYDROUS CITRIC ACID 10; 3.5; 12 MG/160ML; G/160ML; G/160ML
1 LIQUID ORAL TAKE AS DIRECTED
Qty: 320 ML | Refills: 0 | Status: SHIPPED | OUTPATIENT
Start: 2023-01-23 | End: 2023-03-28 | Stop reason: HOSPADM

## 2023-01-23 NOTE — TELEPHONE ENCOUNTER
Caller: VITO FRAZIER    Relationship to patient: Emergency Contact    Best call back number: [phone]    Patient is needing: VITO STATED THAT OFFICE SHOULD BE RECEIVING MEDICAL RECORDS FROM PATIENT'S PREVIOUS PROVIDER FROM [facility] AND WOULD LIKE TO KNOW IF THOSE HAVE BEEN RECEIVED YET    PLEASE ADVISE

## 2023-01-24 ENCOUNTER — TELEPHONE (OUTPATIENT)
Dept: GASTROENTEROLOGY | Facility: CLINIC | Age: 68
End: 2023-01-24
Payer: MEDICARE

## 2023-01-24 NOTE — TELEPHONE ENCOUNTER
----- Message from Jazmine Muñiz MD sent at 1/23/2023  7:56 PM EST -----  Copy of the instruction is on my table.  Not much different from other preparations     I do not think I will  get any time to do the prep instruction at least until the weekend.       ----- Message -----  From: Yari Sherman MA  Sent: 1/23/2023   4:29 PM EST  To: Jazmine Muñiz MD    Do you have the form for the Clenpiq? None of us do.  ----- Message -----  From: Kelly Stallings MA  Sent: 1/23/2023   3:04 PM EST  To: Yari Sherman MA      ----- Message -----  From: Ivis Bedoya APRN  Sent: 1/23/2023  12:44 PM EST  To: Kelly Stallings MA    I sent in Clenpiq for his colonoscopy prep. Dr. Muñiz is supposed to be writing up the instructions. Can you mail him a copy? Thanks!

## 2023-01-24 NOTE — TELEPHONE ENCOUNTER
Called and spoke to Altagracia on [employer]. Let her know that I will be mailing his prep instructions. Verified address.

## 2023-01-27 ENCOUNTER — HOSPITAL ENCOUNTER (OUTPATIENT)
Dept: CT IMAGING | Facility: HOSPITAL | Age: 68
Discharge: HOME | End: 2023-01-27
Admitting: NURSE PRACTITIONER
Payer: MEDICARE

## 2023-01-27 DIAGNOSIS — K59.00 CONSTIPATION, UNSPECIFIED CONSTIPATION TYPE: Chronic | ICD-10-CM

## 2023-01-27 DIAGNOSIS — R10.11 RIGHT UPPER QUADRANT ABDOMINAL PAIN: Chronic | ICD-10-CM

## 2023-01-27 DIAGNOSIS — K83.9 BILE DUCT ABNORMALITY: Chronic | ICD-10-CM

## 2023-01-27 PROCEDURE — 25010000002 IOPAMIDOL 61 % SOLUTION: Performed by: NURSE PRACTITIONER

## 2023-01-27 PROCEDURE — 74177 CT ABD & PELVIS W/CONTRAST: CPT

## 2023-01-27 RX ADMIN — IOPAMIDOL 100 ML: 612 INJECTION, SOLUTION INTRAVENOUS at 15:17

## 2023-01-27 NOTE — TELEPHONE ENCOUNTER
SPOKE WITH [employer]. SHE SAYS THEY FILLED OUT AN [employer]ITA FOR MEDICOPY ONLINE (FOR DR. TYRESE GUZMAN), LAST WEEK. SHE NOTATED THAT SHE WOULD LIKE IT EMAILED TO THEM AND THEY CAN PRINT THEM OFF AND BRING TO US. SHE MAY CHECK BACK WITH US NEXT WEEK TO SEE IF WE HAPPENED TO RECEIVE.

## 2023-03-01 ENCOUNTER — HOSPITAL ENCOUNTER (OUTPATIENT)
Dept: ULTRASOUND IMAGING | Facility: HOSPITAL | Age: 68
Discharge: HOME | End: 2023-03-01
Admitting: NURSE PRACTITIONER
Payer: MEDICARE

## 2023-03-01 DIAGNOSIS — N49.2 NODULE OF SCROTUM: ICD-10-CM

## 2023-03-01 PROCEDURE — 76870 US EXAM SCROTUM: CPT

## 2023-03-02 ENCOUNTER — TELEPHONE (OUTPATIENT)
Dept: FAMILY MEDICINE CLINIC | Facility: CLINIC | Age: 68
End: 2023-03-02

## 2023-03-02 NOTE — TELEPHONE ENCOUNTER
Caller: rl kwok    Relationship: Emergency Contact    Best call back number:  [phone] RL  IF NO ANSWER CALL JOSEPH SPOUSE [phone]    What test was performed:  SCAN ON TESTICLES     When was the test performed: 3/1/23    Additional notes:     RL IS NOT ON  VERBAL

## 2023-03-24 NOTE — PRE-PROCEDURE INSTRUCTIONS
PAT phone history completed with pt for upcoming procedure on 3/28/23, with Dr. Muñiz.     PAT PASS GIVEN/REVIEWED WITH PT.  VERBALIZED UNDERSTANDING OF THE FOLLOWING:  DO NOT EAT, DRINK, SMOKE, USE SMOKELESS TOBACCO OR CHEW GUM AFTER MIDNIGHT THE NIGHT BEFORE SURGERY.  THIS ALSO INCLUDES HARD CANDIES AND MINTS.    DO NOT SHAVE THE AREA TO BE OPERATED ON AT LEAST 48 HOURS PRIOR TO THE PROCEDURE.  DO NOT WEAR MAKE UP OR NAIL POLISH.  DO NOT LEAVE IN ANY PIERCING OR WEAR JEWELRY THE DAY OF SURGERY.      DO NOT USE ADHESIVES IF YOU WEAR DENTURES.    DO NOT WEAR EYE CONTACTS; BRING IN YOUR GLASSES.    ONLY TAKE MEDICATION THE MORNING OF YOUR PROCEDURE IF INSTRUCTED BY YOUR SURGEON WITH ENOUGH WATER TO SWALLOW THE MEDICATION.  IF YOUR SURGEON DID NOT SPECIFY WHICH MEDICATIONS TO TAKE, YOU WILL NEED TO CALL THEIR OFFICE FOR FURTHER INSTRUCTIONS AND DO AS THEY INSTRUCT.    LEAVE ANYTHING YOU CONSIDER VALUABLE AT HOME.    YOU WILL NEED TO ARRANGE FOR SOMEONE TO DRIVE YOU HOME AFTER SURGERY.  IT IS RECOMMENDED THAT YOU DO NOT DRIVE, WORK, DRINK ALCOHOL OR MAKE MAJOR DECISIONS FOR AT LEAST 24 HOURS AFTER YOUR PROCEDURE IS COMPLETE.      THE DAY OF YOUR PROCEDURE, BRING IN THE FOLLOWING IF APPLICABLE:   PICTURE ID AND INSURANCE/MEDICARE OR MEDICAID CARDS/ANY CO-PAY THAT MAY BE DUE   COPY OF ADVANCED DIRECTIVE/LIVING WILL/POWER OR    CPAP/BIPAP/INHALERS   SKIN PREP SHEET   YOUR PREADMISSION TESTING PASS (IF NOT A PHONE HISTORY)

## 2023-03-28 ENCOUNTER — HOSPITAL ENCOUNTER (OUTPATIENT)
Facility: HOSPITAL | Age: 68
Setting detail: HOSPITAL OUTPATIENT SURGERY
Discharge: HOME | End: 2023-03-28
Attending: INTERNAL MEDICINE | Admitting: INTERNAL MEDICINE
Payer: MEDICARE

## 2023-03-28 ENCOUNTER — ANESTHESIA EVENT (OUTPATIENT)
Dept: GASTROENTEROLOGY | Facility: HOSPITAL | Age: 68
End: 2023-03-28
Payer: MEDICARE

## 2023-03-28 ENCOUNTER — ANESTHESIA (OUTPATIENT)
Dept: GASTROENTEROLOGY | Facility: HOSPITAL | Age: 68
End: 2023-03-28
Payer: MEDICARE

## 2023-03-28 VITALS
TEMPERATURE: 97.6 F | BODY MASS INDEX: 21.47 KG/M2 | WEIGHT: 150 LBS | SYSTOLIC BLOOD PRESSURE: 111 MMHG | HEIGHT: 70 IN | RESPIRATION RATE: 18 BRPM | DIASTOLIC BLOOD PRESSURE: 81 MMHG | HEART RATE: 66 BPM | OXYGEN SATURATION: 95 %

## 2023-03-28 DIAGNOSIS — Z80.0 FAMILY HISTORY OF COLON CANCER: ICD-10-CM

## 2023-03-28 DIAGNOSIS — Z12.11 ENCOUNTER FOR SCREENING FOR MALIGNANT NEOPLASM OF COLON: ICD-10-CM

## 2023-03-28 PROCEDURE — 45380 COLONOSCOPY AND BIOPSY: CPT | Performed by: INTERNAL MEDICINE

## 2023-03-28 PROCEDURE — 25010000002 PROPOFOL 1000 MG/100ML EMULSION: Performed by: NURSE ANESTHETIST, CERTIFIED REGISTERED

## 2023-03-28 PROCEDURE — 88305 TISSUE EXAM BY PATHOLOGIST: CPT

## 2023-03-28 RX ORDER — SIMETHICONE 20 MG/.3ML
EMULSION ORAL AS NEEDED
Status: DISCONTINUED | OUTPATIENT
Start: 2023-03-28 | End: 2023-03-28 | Stop reason: HOSPADM

## 2023-03-28 RX ORDER — SODIUM CHLORIDE 9 MG/ML
70 INJECTION, SOLUTION INTRAVENOUS CONTINUOUS PRN
Status: DISCONTINUED | OUTPATIENT
Start: 2023-03-28 | End: 2023-03-28 | Stop reason: HOSPADM

## 2023-03-28 RX ORDER — LIDOCAINE HYDROCHLORIDE 20 MG/ML
INJECTION, SOLUTION INTRAVENOUS AS NEEDED
Status: DISCONTINUED | OUTPATIENT
Start: 2023-03-28 | End: 2023-03-28 | Stop reason: SURG

## 2023-03-28 RX ORDER — PROPOFOL 10 MG/ML
INJECTION, EMULSION INTRAVENOUS AS NEEDED
Status: DISCONTINUED | OUTPATIENT
Start: 2023-03-28 | End: 2023-03-28 | Stop reason: SURG

## 2023-03-28 RX ADMIN — SODIUM CHLORIDE 70 ML/HR: 9 INJECTION, SOLUTION INTRAVENOUS at 08:22

## 2023-03-28 RX ADMIN — LIDOCAINE HYDROCHLORIDE 60 MG: 20 INJECTION, SOLUTION INTRAVENOUS at 09:07

## 2023-03-28 RX ADMIN — PROPOFOL 100 MG: 10 INJECTION, EMULSION INTRAVENOUS at 09:33

## 2023-03-28 RX ADMIN — PROPOFOL 100 MG: 10 INJECTION, EMULSION INTRAVENOUS at 09:07

## 2023-03-28 RX ADMIN — PROPOFOL 100 MG: 10 INJECTION, EMULSION INTRAVENOUS at 09:26

## 2023-03-28 RX ADMIN — PROPOFOL 100 MG: 10 INJECTION, EMULSION INTRAVENOUS at 09:17

## 2023-03-28 NOTE — H&P
[facility]  HISTORY AND PHYSICAL    Patient Name: Juan Sherman  : 1955  MRN: [id number]    Chief Complaint:   For screening colonoscopy    History Of Presenting Illness:    Brother had colon Ca  Last colon over ten yrs ago    Past Medical History:   Diagnosis Date   • Arthritis    • Disease of thyroid gland    • Elevated cholesterol    • GERD (gastroesophageal reflux disease)    • Gout    • Hyperlipidemia    • Hypertension    • MRSA (methicillin resistant Staphylococcus aureus)     of foot 4 years ago       Past Surgical History:   Procedure Laterality Date   • CHOLECYSTECTOMY     • COLONOSCOPY      over 10 years ago   • ERCP     • FOOT IRRIGATION, DEBRIDEMENT AND REPAIR     • HYDROCELECTOMY     • UPPER GASTROINTESTINAL ENDOSCOPY         Social History     Socioeconomic History   • Marital status:    Tobacco Use   • Smoking status: Former     Packs/day: 0.50     Years: 3.00     Pack years: 1.50     Types: Cigarettes     Quit date: 1976     Years since quittin.3   • Smokeless tobacco: Never   Vaping Use   • Vaping Use: Never used   Substance and Sexual Activity   • Alcohol use: Yes     Alcohol/week: 1.0 standard drink     Types: 1 Cans of beer per week     Comment: occasionally   • Drug use: No   • Sexual activity: Defer       Family History   Problem Relation Age of Onset   • Diabetes Mother    • Heart attack Mother    • Diabetes Sister    • Cancer Sister    • Arthritis Sister    • Diabetes Brother    • Colon cancer Brother         diagnosed in his 50-60's   • Colon cancer Paternal Uncle         2-3 uncles       Prior to Admission Medications:  Medications Prior to Admission   Medication Sig Dispense Refill Last Dose   • allopurinol (ZYLOPRIM) 100 MG tablet Take 1 tablet by mouth 2 (Two) Times a Day. 180 tablet 2 3/27/2023   • Cobalamin Combinations (Neuriva Plus) capsule Take 1 tablet by mouth At Night As Needed.   3/27/2023   • Coenzyme Q10-Vitamin E (QUNOL  ULTRA COQ10 PO) Take 100 mg by mouth Daily.   3/27/2023   • diclofenac (VOLTAREN) 50 MG EC tablet Take 1 tablet by mouth 2 (Two) Times a Day As Needed.   3/27/2023   • dicyclomine (BENTYL) 20 MG tablet Take 1 tablet by mouth 3 (Three) Times a Day. 270 tablet 2 3/27/2023   • docusate calcium (SURFAK) 240 MG capsule Take 1 capsule by mouth 2 (Two) Times a Day As Needed.   3/27/2023   • fluticasone (FLONASE) 50 MCG/ACT nasal spray 2 sprays into the nostril(s) as directed by provider As Needed. Administer 2 sprays in each nostril for each dose.   3/27/2023   • levothyroxine (SYNTHROID, LEVOTHROID) 88 MCG tablet Take 100 mcg by mouth Daily.   3/27/2023   • Melatonin 10 MG capsule Take 10 mg by mouth Every Night.   3/27/2023   • metaxalone (SKELAXIN) 800 MG tablet Take 1 tablet by mouth 3 (Three) Times a Day As Needed for Muscle Spasms.   3/27/2023   • multivitamin with minerals tablet tablet Take 1 tablet by mouth Daily.   3/27/2023   • pantoprazole (PROTONIX) 40 MG EC tablet Take 1 tablet by mouth 2 (Two) Times a Day.   3/27/2023   • Sod Picosulfate-Mag Ox-Cit Acd (Clenpiq) 10-3.5-12 MG-GM -GM/160ML solution Take 160 mL by mouth Take As Directed. Take as directed for colonoscopy prep. Patient has instructions. 320 mL 0 3/27/2023   • vitamin D (CHOLECALCIFEROL) 400 UNITS tablet Take 1 tablet by mouth Daily.   3/27/2023       Allergies:  Allergies   Allergen Reactions   • Lipitor [Atorvastatin] Other (See Comments)     MAKES HIM SORE AND STIFF         Vitals: Temp:  [97.5 °F (36.4 °C)] 97.5 °F (36.4 °C)  Heart Rate:  [79] 79  Resp:  [18] 18  BP: (136)/(92) 136/92    Review Of Systems:  Constitutional:  Negative for chills, fever, and unexpected weight change.  Respiratory:  Negative for cough, chest tightness, shortness of breath, and wheezing.  Cardiovascular:  Negative for chest pain, palpitations, and leg swelling.  Gastrointestinal:  Negative for abdominal distention, abdominal pain, nausea, vomiting.  Neurological:   Negative for weakness, numbness, and headaches.     Physical Exam:    General Appearance:  Alert, cooperative, in no acute distress.   Lungs:   Clear to auscultation, respirations regular, even and                 unlabored.   Heart:  Regular rhythm and normal rate.   Abdomen:   Normal bowel sounds, no masses, no organomegaly. Soft, nontender, nondistended   Neurologic: Alert and oriented x 3. Moves all four limbs equally       Assessment & Plan     Assessment:  Active Problems:    Encounter for screening for malignant neoplasm of colon    Family history of colon cancer      Plan: COLONOSCOPY (N/A)     Jazmine Muñiz MD  3/28/2023

## 2023-03-28 NOTE — DISCHARGE INSTRUCTIONS
Discharge patient to home (ambulatory).  - High fiber diet.  - Continue present medications.  - Await pathology results.  - Repeat colonoscopy in 5 years for screening purposes due to significant family history.  - Return to GI office in 8 weeks.

## 2023-03-28 NOTE — ANESTHESIA POSTPROCEDURE EVALUATION
Patient: Juan Sherman    Procedure Summary     Date: 03/28/23 Room / Location: [facility] ENDOSCOPY 2 / [facility] ENDOSCOPY    Anesthesia Start: 0907 Anesthesia Stop: 0938    Procedure: COLONOSCOPY with biopsy (Anus) Diagnosis:       Encounter for screening for malignant neoplasm of colon      Family history of colon cancer      (Encounter for screening for malignant neoplasm of colon [Z12.11])      (Family history of colon cancer [Z80.0])    Surgeons: Jazmine Muñiz MD Provider: Jasvir Kan CRNA    Anesthesia Type: MAC ASA Status: 2          Anesthesia Type: MAC    Vitals  Vitals Value Taken Time   /81 03/28/23 0953   Temp 97.6 °F (36.4 °C) 03/28/23 0940   Pulse 66 03/28/23 0953   Resp 18 03/28/23 0953   SpO2 95 % 03/28/23 0953           Post Anesthesia Care and Evaluation    Patient location during evaluation: bedside  Patient participation: complete - patient participated  Level of consciousness: awake  Pain score: 0  Pain management: adequate    Airway patency: patent  Anesthetic complications: No anesthetic complications  PONV Status: controlled  Cardiovascular status: acceptable and stable  Respiratory status: acceptable and room air  Hydration status: acceptable    Comments: Vital signs as noted in nursing documentation as per protocol

## 2023-03-28 NOTE — ANESTHESIA PREPROCEDURE EVALUATION
Anesthesia Evaluation     Patient summary reviewed and Nursing notes reviewed   NPO Solid Status: > 8 hours  NPO Liquid Status: > 2 hours           Airway   Mallampati: II  TM distance: >3 FB  Neck ROM: full  Possible difficult intubation  Dental - normal exam     Pulmonary - normal exam   (+) a smoker Former,   Cardiovascular - normal exam    ECG reviewed    (+) hypertension, hyperlipidemia,       Neuro/Psych  GI/Hepatic/Renal/Endo    (+)  GERD,  thyroid problem     Musculoskeletal     Abdominal    Substance History   (+) alcohol use,      OB/GYN          Other   arthritis,                      Anesthesia Plan    ASA 2     MAC     (Risks and benefits discussed including risk of aspiration, recall and dental damage. All patient questions answered.    Will continue with plan of care.)  intravenous induction     Anesthetic plan, risks, benefits, and alternatives have been provided, discussed and informed consent has been obtained with: patient.  Pre-procedure education provided  Plan discussed with CRNA.        CODE STATUS:

## 2023-03-29 LAB — REF LAB TEST METHOD: NORMAL

## 2023-04-12 ENCOUNTER — OFFICE VISIT (OUTPATIENT)
Dept: GASTROENTEROLOGY | Facility: CLINIC | Age: 68
End: 2023-04-12
Payer: MEDICARE

## 2023-04-12 VITALS
SYSTOLIC BLOOD PRESSURE: 110 MMHG | OXYGEN SATURATION: 98 % | BODY MASS INDEX: 22.24 KG/M2 | WEIGHT: 155 LBS | DIASTOLIC BLOOD PRESSURE: 70 MMHG | HEART RATE: 63 BPM

## 2023-04-12 DIAGNOSIS — Z80.0 FAMILY HISTORY OF COLON CANCER: ICD-10-CM

## 2023-04-12 DIAGNOSIS — K59.09 CHRONIC CONSTIPATION: ICD-10-CM

## 2023-04-12 DIAGNOSIS — K21.9 GASTROESOPHAGEAL REFLUX DISEASE, UNSPECIFIED WHETHER ESOPHAGITIS PRESENT: ICD-10-CM

## 2023-04-12 DIAGNOSIS — Z12.11 ENCOUNTER FOR SCREENING FOR MALIGNANT NEOPLASM OF COLON: ICD-10-CM

## 2023-04-12 DIAGNOSIS — R10.11 RIGHT UPPER QUADRANT ABDOMINAL PAIN: Primary | ICD-10-CM

## 2023-04-12 DIAGNOSIS — K83.9 BILE DUCT ABNORMALITY: ICD-10-CM

## 2023-04-12 PROBLEM — R93.3 GASTROINTESTINAL TRACT IMAGING ABNORMALITY: Status: ACTIVE | Noted: 2023-04-12

## 2023-04-12 PROCEDURE — 1159F MED LIST DOCD IN RCRD: CPT | Performed by: NURSE PRACTITIONER

## 2023-04-12 PROCEDURE — 99214 OFFICE O/P EST MOD 30 MIN: CPT | Performed by: NURSE PRACTITIONER

## 2023-04-12 PROCEDURE — 1160F RVW MEDS BY RX/DR IN RCRD: CPT | Performed by: NURSE PRACTITIONER

## 2023-04-12 NOTE — PATIENT INSTRUCTIONS
Antireflux measures: Avoid fried, fatty foods, alcohol, chocolate, coffee, tea,  soft drinks, peppermint and spearmint, spicy foods, tomatoes and tomato based foods, onion based foods, and smoking.  Other antireflux measures include weight reduction if overweight, avoiding tight clothing around the abdomen, elevating the head of the bed 6 inches with blocks under the head board, and don't drink or eat before going to bed and avoid lying down immediately after meals.  Pantoprazole 40 mg 1 po daily in the am 30 minutes before breakfast. Only take evening dose if needed.  Recommended to take Levothyroxine first in the am upon waking, wait 30 minutes, then take Pantoprazole 40 mg, wait 30 minutes, then eat breakfast and take other medications.   The patient should eat 4-5 very small meals throughout the day. Avoid large meals.  It is recommended to eat a softer diet. Meats are best consumed ground. Fruits and vegetables are best consumed cooked or steamed and then mashed.    Low fiber, low fat diet with liberal water intake.   Metamucil 1 packet daily.   Miralax 17 grams daily.  Continue stool softeners daily as needed for constipation.   Colonoscopy for screening in 5 years, March 2028.   Keep follow up with [facility] to discuss results.   Follow up: 6 months

## 2023-04-12 NOTE — PROGRESS NOTES
"     Follow Up Note     Date: 2023   Patient Name: Juan Sherman  MRN: [id number]  : 1955     Primary Care Provider: Mami Cheng APRN     Chief Complaint   Patient presents with   • Follow-up   • Abdominal Pain     History of present illness:   2023  Juan Sherman is a 67 y.o. male who is here today for follow up for abdominal pain. He continues to have some upper abdominal pain that comes and goes. He went back to [facility] and they did an MRI and a CT scan to re-evaluate the bile duct. He has a follow up next month to discuss all results and see if any further testing is needed. Denies any GI bleeding.     Interval History:  2023  He has been having pain in the RUQ for the past 2 years. He had an ERCP in  at [facility] and was told his bile duct \"did not form correctly\". He was advised to follow up in 6 months, but he did not go back. He has swelling in the RUQ that comes and goes. He has the pain daily that comes and goes. The pain occurs if he over eats. If he lays on his right side, the pain is worse. Denies nausea or vomiting. Denies melena.      He has a history of reflux for over 30 years. He is currently taking Pantoprazole 40 mg twice a day for a couple of years with reasonable control. If he goes down to once a day, his reflux is severe. He denies difficulty swallowing.      He has a history of constipation off and on for several years that is reasonably controlled with stool softeners. He usually has 1 soft bowel movement per day. Denies rectal bleeding.      His last colonoscopy was about 10 years ago per patient. His last EGD was around . He has a family history of colon cancer in his brother, diagnosed in his 50-60's. He had 2-3 uncles with colon cancer, unknown age at diagnosis.    Subjective      Past Medical History:   Diagnosis Date   • Arthritis    • Disease of thyroid gland    • Elevated cholesterol    • GERD (gastroesophageal reflux disease)    • Gout  "   • Hyperlipidemia    • Hypertension    • MRSA (methicillin resistant Staphylococcus aureus)     of foot 4 years ago     Past Surgical History:   Procedure Laterality Date   • CHOLECYSTECTOMY     • COLONOSCOPY      over 10 years ago   • COLONOSCOPY N/A 3/28/2023    Procedure: COLONOSCOPY with biopsy;  Surgeon: Jazmine Muñiz MD;  Location: [facility] ENDOSCOPY;  Service: Gastroenterology;  Laterality: N/A;   • ERCP     • FOOT IRRIGATION, DEBRIDEMENT AND REPAIR     • HYDROCELECTOMY     • UPPER GASTROINTESTINAL ENDOSCOPY       Family History   Problem Relation Age of Onset   • Diabetes Mother    • Heart attack Mother    • Diabetes Sister    • Cancer Sister    • Arthritis Sister    • Diabetes Brother    • Colon cancer Brother         diagnosed in his 50-60's   • Colon cancer Paternal Uncle         2-3 uncles     Social History     Socioeconomic History   • Marital status:    Tobacco Use   • Smoking status: Former     Packs/day: 0.50     Years: 3.00     Pack years: 1.50     Types: Cigarettes     Quit date: 1976     Years since quittin.4   • Smokeless tobacco: Never   Vaping Use   • Vaping Use: Never used   Substance and Sexual Activity   • Alcohol use: Yes     Alcohol/week: 1.0 standard drink     Types: 1 Cans of beer per week     Comment: occasionally   • Drug use: No   • Sexual activity: Defer       Current Outpatient Medications:   •  allopurinol (ZYLOPRIM) 100 MG tablet, Take 1 tablet by mouth 2 (Two) Times a Day., Disp: 180 tablet, Rfl: 2  •  Cobalamin Combinations (Neuriva Plus) capsule, Take 1 tablet by mouth At Night As Needed., Disp: , Rfl:   •  Coenzyme Q10-Vitamin E (QUNOL ULTRA COQ10 PO), Take 100 mg by mouth Daily., Disp: , Rfl:   •  diclofenac (VOLTAREN) 50 MG EC tablet, Take 1 tablet by mouth 2 (Two) Times a Day As Needed., Disp: , Rfl:   •  dicyclomine (BENTYL) 20 MG tablet, Take 1 tablet by mouth 3 (Three) Times a Day., Disp: 270 tablet, Rfl: 2  •  docusate calcium (SURFAK)  240 MG capsule, Take 1 capsule by mouth 2 (Two) Times a Day As Needed., Disp: , Rfl:   •  fluticasone (FLONASE) 50 MCG/ACT nasal spray, 2 sprays into the nostril(s) as directed by provider As Needed. Administer 2 sprays in each nostril for each dose., Disp: , Rfl:   •  levothyroxine (SYNTHROID, LEVOTHROID) 88 MCG tablet, Take 100 mcg by mouth Daily., Disp: , Rfl:   •  Melatonin 10 MG capsule, Take 10 mg by mouth Every Night., Disp: , Rfl:   •  multivitamin with minerals tablet tablet, Take 1 tablet by mouth Daily., Disp: , Rfl:   •  pantoprazole (PROTONIX) 40 MG EC tablet, Take 1 tablet by mouth 2 (Two) Times a Day., Disp: , Rfl:   •  vitamin D (CHOLECALCIFEROL) 400 UNITS tablet, Take 1 tablet by mouth Daily., Disp: , Rfl:      Allergies   Allergen Reactions   • Lipitor [Atorvastatin] Other (See Comments)     MAKES HIM SORE AND STIFF      The following portions of the patient's history were reviewed and updated as appropriate: allergies, current medications, past family history, past medical history, past social history, past surgical history and problem list.  Objective     Physical Exam  Vitals and nursing note reviewed.   Constitutional:       General: He is not in acute distress.     Appearance: Normal appearance. He is well-developed.   HENT:      Head: Normocephalic and atraumatic.      Mouth/Throat:      Mouth: Mucous membranes are not pale, not dry and not cyanotic.   Eyes:      General: Lids are normal.   Neck:      Trachea: Trachea normal.   Cardiovascular:      Rate and Rhythm: Normal rate.   Pulmonary:      Effort: Pulmonary effort is normal. No respiratory distress.      Breath sounds: Normal breath sounds.   Abdominal:      General: Bowel sounds are normal.      Palpations: Abdomen is soft. There is no mass.      Tenderness: There is no abdominal tenderness.      Hernia: No hernia is present.   Skin:     General: Skin is warm and dry.   Neurological:      Mental Status: He is alert and oriented to  person, place, and time.   Psychiatric:         Mood and Affect: Mood normal.         Speech: Speech normal.         Behavior: Behavior normal. Behavior is cooperative.       Vitals:    23 1110   BP: 110/70   Pulse: 63   SpO2: 98%   Weight: 70.3 kg (155 lb)     Body mass index is 22.24 kg/m².     Results Review:   I reviewed the patient's new clinical results.    Admission on 2023, Discharged on 2023   Component Date Value Ref Range Status   • Reference Lab Report 2023    Final                    Value:[facility]  [address] [place] [address]  Phone: [phone] or [phone]  Fax: [phone]  Jorge Connors M.D., Medical Director    PATIENT NAME                                     LABORATORY NO.  JOAQUÍN CHRIS                               [id number]  [id number]                                 AGE                    SEX   SSN              CLIENT REF #  [facility]                    67        1955           [id number]      [id number]    [address] [place] [facility]                        REQUESTING M.D.           ATTENDING [facility]         COPY TO.   [address]                                [address]  DATE COLLECTED            DATE RECEIVED          DATE REPORTED  2023    DIAGNOSIS:  A.     TERMINAL ILEUM BIOPSY:  Ileal mucosa with no significant histopathologic                           abnormality  Negative for dysplasia or carcinoma    B.     RANDOM COLON BIOPSIES:  Colonic mucosa with no significant histopathologic abnormality  No evidence of dysplasia, carcinoma, or microscopic colitis    REVIEWED,                           DIAGNOSED AND ELECTRONICALLY  SIGNED BY:    Hakeem Venegas M.D.,F.C.A.P.  CPT CODES:  88305x2     Lab on 2023   Component Date Value Ref  Range Status   • Glucose 01/18/2023 95  65 - 99 mg/dL Final   • BUN 01/18/2023 31 (H)  8 - 23 mg/dL Final   • Creatinine 01/18/2023 1.38 (H)  0.76 - 1.27 mg/dL Final   • Sodium 01/18/2023 143  136 - 145 mmol/L Final   • Potassium 01/18/2023 4.9  3.5 - 5.2 mmol/L Final   • Chloride 01/18/2023 106  98 - 107 mmol/L Final   • CO2 01/18/2023 28.1  22.0 - 29.0 mmol/L Final   • Calcium 01/18/2023 9.7  8.6 - 10.5 mg/dL Final   • Total Protein 01/18/2023 7.3  6.0 - 8.5 g/dL Final   • Albumin 01/18/2023 4.5  3.5 - 5.2 g/dL Final   • ALT (SGPT) 01/18/2023 19  1 - 41 U/L Final   • AST (SGOT) 01/18/2023 20  1 - 40 U/L Final   • Alkaline Phosphatase 01/18/2023 61  39 - 117 U/L Final   • Total Bilirubin 01/18/2023 0.5  0.0 - 1.2 mg/dL Final   • Globulin 01/18/2023 2.8  gm/dL Final   • A/G Ratio 01/18/2023 1.6  g/dL Final   • BUN/Creatinine Ratio 01/18/2023 22.5  7.0 - 25.0 Final   • Anion Gap 01/18/2023 8.9  5.0 - 15.0 mmol/L Final   • eGFR 01/18/2023 56.0 (L)  >60.0 mL/min/1.73 Final   • WBC 01/18/2023 4.34  3.40 - 10.80 10*3/mm3 Final   • RBC 01/18/2023 4.38  4.14 - 5.80 10*6/mm3 Final   • Hemoglobin 01/18/2023 14.3  13.0 - 17.7 g/dL Final   • Hematocrit 01/18/2023 41.6  37.5 - 51.0 % Final   • MCV 01/18/2023 95.0  79.0 - 97.0 fL Final   • MCH 01/18/2023 32.6  26.6 - 33.0 pg Final   • MCHC 01/18/2023 34.4  31.5 - 35.7 g/dL Final   • RDW 01/18/2023 12.3  12.3 - 15.4 % Final   • RDW-SD 01/18/2023 42.6  37.0 - 54.0 fl Final   • MPV 01/18/2023 9.2  6.0 - 12.0 fL Final   • Platelets 01/18/2023 196  140 - 450 10*3/mm3 Final   • Lipase 01/18/2023 22  13 - 60 U/L Final   • IgA 01/18/2023 146  70 - 400 mg/dL Final   • Tissue Transglutaminase IgA 01/18/2023 <2  0 - 3 U/mL Final                                  Negative        0 -  3                                Weak Positive   4 - 10                                Positive           >10   Tissue Transglutaminase (tTG) has been identified   as the endomysial antigen.  Studies have  demonstr-   ated that endomysial IgA antibodies have over 99%   specificity for gluten sensitive enteropathy.      CT Abdomen Pelvis With Contrast     Result Date: 3/25/2023  1. No evidence of pancreatic ductal dilatation. No suspicious pancreatic masses. A small gas and oral contrast containing periampullary duodenal diverticulum is identified likely corresponding to the abnormality demonstrated on the prior MRI scan as described above. No abnormal calcifications.     CT Abdomen Pelvis With Contrast     Result Date: 1/27/2023  Question tiny bubble of air in a nondilated biliary system in this postcholecystectomy patient; no prior study for comparison.  Simple appearing left renal cystic lesion.  Right nephrolithiasis.        MR Abdomen w and wo IV Contrast     Result Date: 3/6/2023  Mild tortuosity of the mid aspect of the extrahepatic common bile duct without definite stricture or abnormal peribiliary enhancement. There is an indeterminate focus of susceptibility artifact within the periampullary region which may represent a pancreatic parenchymal calcification posteriorly abutting and potentially compressing the inferiormost common bile duct. This is incompletely evaluated and a CT would provide helpful complementary characterisation.   Pancreatic divisum morphology. Incidental but presumably benign findings: *Mild splenomegaly. *Small 1 cm benign appearing right adrenal cyst. *Diffuse gastric wall thickening involving proximal to mid body could be related to chronic PPI use or underlying gastritis.    EGD dated 8/25/2015 at [facility] per Care Everywhere  -Hiatal hernia.  Biopsy.  -Normal stomach.  Biopsied.  -Multiple gastric polyps.  Biopsied.  -Normal examined duodenum.  Small intestine duodenum biopsy with no pathologic abnormality, no evidence of celiac disease, no organisms.  Stomach biopsy with findings suggestive of previous erosion, no H. pylori organisms identified.  Esophagus biopsy with no  pathologic abnormalities.  Stomach polyp biopsy with fundic gland polyps.     ERCP Dated 2/17/2021 per Care Everywhere  - Procedure report not available.  - Bile duct mid biopsy with benign ductal epithelium with lamina propria fibrosis and mild chronic inflammatory infiltrate.  No evidence of malignancy.    Colonoscopy dated 3/28/2023 per Dr. Muñiz  - Diverticulosis in the sigmoid colon.  - Non-bleeding external and internal hemorrhoids.  - The examined portion of the ileum was normal. Biopsied.  - Biopsies were taken with a cold forceps for histology in the descending colon, in the transverse colon and in the ascending colon.  A.     TERMINAL ILEUM BIOPSY:   Ileal mucosa with no significant histopathologic abnormality   Negative for dysplasia or carcinoma   B.     RANDOM COLON BIOPSIES:   Colonic mucosa with no significant histopathologic abnormality   No evidence of dysplasia, carcinoma, or microscopic colitis     Assessment / Plan      1. Right upper quadrant abdominal pain  2. Gastroesophageal reflux disease, unspecified whether esophagitis present  3. Bile duct abnormality  He continues to have RUQ pain that comes and goes. Pain is worse when he lays down at night. He has gone back to  GI for evaluation of bile duct abnormality, he had MRI and CT scan, he goes back next month for the results. Reflux reasonably controlled. Denies nausea or vomiting. No difficulty swallowing. Abdomen nontender to palpation. Lipase normal. Celiac panel negative. Basic labs unremarkable, liver enzymes normal.  EGD in 2015 with multiple gastric polyps noted, biopsy with fundic gland polyps.  ERCP dated 2/17/2021 with procedure report not available.  Pathology results show bile duct mid biopsy with benign ductal epithelium and lamina propria fibrosis and mild chronic inflammatory infiltrate.  No evidence of malignancy. No EGD report available for 2021. CTAP dated 1/27/2023 with question tiny bubble of air in a nondilated  biliary system in postcholecystectomy patient. MRI abdomen w wo contrast 3/6/2023 with Mild tortuosity of the mid aspect of the extrahepatic common bile duct without definite stricture or abnormal peribiliary enhancement. Incidental finding of diffuse gastric wall thickening noted that could be r/t chronic PPI use or underlying gastritis. Follow up CTAP 3/25/2023 with no evidence of pancreatic ductal dilatation or pancreatic masses.   Anti-reflux measures.  Continue high dose PPI for now.  Discussed EGD for evaluation of diffuse gastric wall thickening on MRI - he wants to discuss with  GI first. He will call back if he wishes to pursue this here instead of [place].    4. Chronic constipation  He has constipation off and on for several years that is reasonably controlled with stool softeners. He has Miralax to take as needed. Denies GI bleeding. Basic labs unremarkable. TSH normal. Celiac panel normal. CTAP with unremarkable colon. Colonoscopy unremarkable, TI and random biopsies unremarkable.  High fiber, low fat diet with liberal water intake.  Miralax daily for constipation.   May take stool softeners as needed.     5. Encounter for screening for malignant neoplasm of colon  6. Family history of colon cancer  Colonoscopy unremarkable, no polyps removed. There is a family history of colon cancer in his brother diagnosed in his 50s or 60s, he also had 2 uncles with colon cancer, unknown age at diagnosis.  Colonoscopy for high risk screening in 5 years, 2028.    Patient Instructions   1. Antireflux measures: Avoid fried, fatty foods, alcohol, chocolate, coffee, tea,  soft drinks, peppermint and spearmint, spicy foods, tomatoes and tomato based foods, onion based foods, and smoking.  2. Other antireflux measures include weight reduction if overweight, avoiding tight clothing around the abdomen, elevating the head of the bed 6 inches with blocks under the head board, and don't drink or eat before going to bed and  avoid lying down immediately after meals.  3. Pantoprazole 40 mg 1 po daily in the am 30 minutes before breakfast. Only take evening dose if needed.  4. Recommended to take Levothyroxine first in the am upon waking, wait 30 minutes, then take Pantoprazole 40 mg, wait 30 minutes, then eat breakfast and take other medications.   5. The patient should eat 4-5 very small meals throughout the day. Avoid large meals.  6. It is recommended to eat a softer diet. Meats are best consumed ground. Fruits and vegetables are best consumed cooked or steamed and then mashed.    7. Low fiber, low fat diet with liberal water intake.   8. Metamucil 1 packet daily.   9. Miralax 17 grams daily.  10. Continue stool softeners daily as needed for constipation.   11. Colonoscopy for screening in 5 years, March 2028.   12. Keep follow up with [facility] to discuss results.   13. Follow up: 6 months    Ivis Bedoya, APRN  4/12/2023    Please note that portions of this note were completed with a voice recognition program.

## 2023-05-08 NOTE — TELEPHONE ENCOUNTER
Caller: LaneTeressa    Relationship: Spouse    Best call back number: [phone]    Requested Prescriptions:   Requested Prescriptions     Pending Prescriptions Disp Refills   • levothyroxine (SYNTHROID, LEVOTHROID) 88 MCG tablet       Sig: Take 1 tablet by mouth Daily.        Pharmacy where request should be sent: [facility] - [facility] [address] - [phone]  - [phone] FX     Last office visit with prescribing clinician: 1/19/2023   Last telemedicine visit with prescribing clinician: 7/19/2023   Next office visit with prescribing clinician: 7/19/2023     Additional details provided by patient: [facility] SENT A LETTER SAYING THIS WAS DENIED BY PRESCRIBER. 90 DAY SUPPLY.  Does the patient have less than a 3 day supply:  [] Yes  [x] No    Would you like a call back once the refill request has been completed: [x] Yes [] No    If the office needs to give you a call back, can they leave a voicemail: [x] Yes [] No    Arturo Hale, PCT   05/08/23 10:54 EDT

## 2023-05-09 RX ORDER — LEVOTHYROXINE SODIUM 88 UG/1
100 TABLET ORAL DAILY
Qty: 30 TABLET | Refills: 0 | Status: SHIPPED | OUTPATIENT
Start: 2023-05-09

## 2023-06-06 RX ORDER — LEVOTHYROXINE SODIUM 88 UG/1
TABLET ORAL
Qty: 30 TABLET | Refills: 0 | Status: SHIPPED | OUTPATIENT
Start: 2023-06-06

## 2023-06-19 RX ORDER — PANTOPRAZOLE SODIUM 40 MG/1
40 TABLET, DELAYED RELEASE ORAL 2 TIMES DAILY
Qty: 60 TABLET | Refills: 1 | Status: SHIPPED | OUTPATIENT
Start: 2023-06-19

## 2023-06-19 NOTE — TELEPHONE ENCOUNTER
Caller: Teressa Sherman    Relationship: Emergency Contact    Best call back number:  [phone]     Requested Prescriptions:   Requested Prescriptions     Pending Prescriptions Disp Refills    pantoprazole (PROTONIX) 40 MG EC tablet       Sig: Take 1 tablet by mouth 2 (Two) Times a Day.      Pharmacy where request should be sent: [facility] [place]     Last office visit with prescribing clinician: 1/19/2023   Last telemedicine visit with prescribing clinician: Visit date not found   Next office visit with prescribing clinician: 7/20/2023     Additional details provided by patient:  THE MEDICINE BOTTLE THE PATIENT HAS WAS FILLED BY DR WATERMAN   AND TERESSA THOUGHT THE PATIENT HAD QUIT TAKEN THEM BUT HE HAS NOT   REQUESTING ANOTHER REFILL SENT TO THE PHARMACY   1 TABLET A DAY     Does the patient have less than a 3 day supply:  [x] Yes  [] No    Would you like a call back once the refill request has been completed: [] Yes [x] No    If the office needs to give you a call back, can they leave a voicemail: [] Yes [x] No

## 2023-07-28 RX ORDER — LEVOTHYROXINE SODIUM 88 UG/1
TABLET ORAL
Qty: 60 TABLET | Refills: 2 | Status: SHIPPED | OUTPATIENT
Start: 2023-07-28

## 2023-08-11 DIAGNOSIS — K21.9 GASTROESOPHAGEAL REFLUX DISEASE, UNSPECIFIED WHETHER ESOPHAGITIS PRESENT: ICD-10-CM

## 2023-08-11 RX ORDER — PANTOPRAZOLE SODIUM 40 MG/1
TABLET, DELAYED RELEASE ORAL
Qty: 60 TABLET | Refills: 0 | Status: SHIPPED | OUTPATIENT
Start: 2023-08-11

## 2023-09-11 DIAGNOSIS — K21.9 GASTROESOPHAGEAL REFLUX DISEASE, UNSPECIFIED WHETHER ESOPHAGITIS PRESENT: ICD-10-CM

## 2023-09-12 RX ORDER — PANTOPRAZOLE SODIUM 40 MG/1
TABLET, DELAYED RELEASE ORAL
Qty: 120 TABLET | Refills: 0 | Status: SHIPPED | OUTPATIENT
Start: 2023-09-12

## 2023-10-05 DIAGNOSIS — M15.9 OSTEOARTHRITIS OF MULTIPLE JOINTS, UNSPECIFIED OSTEOARTHRITIS TYPE: ICD-10-CM

## 2023-10-05 RX ORDER — MELOXICAM 15 MG/1
TABLET ORAL
Qty: 90 TABLET | Refills: 0 | Status: SHIPPED | OUTPATIENT
Start: 2023-10-05

## 2023-12-06 ENCOUNTER — OFFICE VISIT (OUTPATIENT)
Dept: UROLOGY | Facility: CLINIC | Age: 68
End: 2023-12-06
Payer: COMMERCIAL

## 2023-12-06 VITALS
SYSTOLIC BLOOD PRESSURE: 120 MMHG | WEIGHT: 154.4 LBS | BODY MASS INDEX: 22.1 KG/M2 | DIASTOLIC BLOOD PRESSURE: 84 MMHG | HEIGHT: 70 IN

## 2023-12-06 DIAGNOSIS — N40.0 BENIGN PROSTATIC HYPERPLASIA, UNSPECIFIED WHETHER LOWER URINARY TRACT SYMPTOMS PRESENT: Primary | ICD-10-CM

## 2023-12-06 DIAGNOSIS — N52.9 ERECTILE DYSFUNCTION, UNSPECIFIED ERECTILE DYSFUNCTION TYPE: ICD-10-CM

## 2023-12-06 LAB
BILIRUB BLD-MCNC: NEGATIVE MG/DL
CLARITY, POC: CLEAR
COLOR UR: YELLOW
EXPIRATION DATE: NORMAL
GLUCOSE UR STRIP-MCNC: NEGATIVE MG/DL
KETONES UR QL: NEGATIVE
LEUKOCYTE EST, POC: NEGATIVE
Lab: 0
NITRITE UR-MCNC: NEGATIVE MG/ML
PH UR: 6 [PH] (ref 5–8)
PROT UR STRIP-MCNC: NEGATIVE MG/DL
RBC # UR STRIP: NEGATIVE /UL
SP GR UR: 1.01 (ref 1–1.03)
UROBILINOGEN UR QL: NORMAL

## 2023-12-06 RX ORDER — TADALAFIL 5 MG/1
5 TABLET ORAL DAILY
Qty: 30 TABLET | Refills: 11 | Status: SHIPPED | OUTPATIENT
Start: 2023-12-06

## 2023-12-06 RX ORDER — SILDENAFIL CITRATE 20 MG/1
20 TABLET ORAL SEE ADMIN INSTRUCTIONS
Qty: 30 TABLET | Refills: 0 | Status: CANCELLED | OUTPATIENT
Start: 2023-12-06

## 2023-12-06 NOTE — PROGRESS NOTES
Office Visit Erectile Dysfunction New     Patient Name: Juan Sherman  : 1955   MRN: [id number]     Chief Complaint: Erectile Dysfunction.   Chief Complaint   Patient presents with    Erectile Dysfunction       Referring Provider: Haleigh Begum AP*    History of Present Illness: Juan Sherman is a 68 y.o. male who presents today with history of constipation who presents with erectile dysfunction and LUTS. This has been going on for several years and became impossible the last couple of years. He is also concerned with urinary symptoms and his prostate    He primarily has difficulty with achieving and maintaining an erection.    He has tried OTC meds in the past with unsatisfactory results.    Without medication, he rates his erectile rigidity as a 0 on a scale of 0-10 (with 6 being just firm enough for penetration).       Subjective      Review of System:   As noted in HPI    Past Medical History:   Past Medical History:   Diagnosis Date    Arthritis     Disease of thyroid gland     Elevated cholesterol     GERD (gastroesophageal reflux disease)     Gout     Hyperlipidemia     Hypertension     MRSA (methicillin resistant Staphylococcus aureus)     of foot 4 years ago       Past Surgical History:   Past Surgical History:   Procedure Laterality Date    CHOLECYSTECTOMY      COLONOSCOPY      over 10 years ago    COLONOSCOPY N/A 3/28/2023    Procedure: COLONOSCOPY with biopsy;  Surgeon: Jazmine Muñiz MD;  Location: [facility] ENDOSCOPY;  Service: Gastroenterology;  Laterality: N/A;    ERCP      FOOT IRRIGATION, DEBRIDEMENT AND REPAIR      HYDROCELECTOMY      UPPER GASTROINTESTINAL ENDOSCOPY         Family History:   Family History   Problem Relation Age of Onset    Diabetes Mother     Heart attack Mother     Diabetes Sister     Cancer Sister     Arthritis Sister     Diabetes Brother     Colon cancer Brother         diagnosed in his 50-60's    Colon cancer Paternal Uncle          2-3 uncles     He has no family history of prostate, bladder or kidney cancer.  There is a strong family history of cardiovascular disease.    Social History:   Social History     Socioeconomic History    Marital status:    Tobacco Use    Smoking status: Former     Packs/day: 0.50     Years: 3.00     Additional pack years: 0.00     Total pack years: 1.50     Types: Cigarettes     Quit date: 1976     Years since quittin.0     Passive exposure: Never    Smokeless tobacco: Never   Vaping Use    Vaping Use: Never used   Substance and Sexual Activity    Alcohol use: Yes     Alcohol/week: 1.0 standard drink of alcohol     Types: 1 Cans of beer per week     Comment: occasionally    Drug use: No    Sexual activity: Defer       Medications:     Current Outpatient Medications:     allopurinol (ZYLOPRIM) 100 MG tablet, Take 1 tablet by mouth 2 (Two) Times a Day., Disp: 180 tablet, Rfl: 2    Cobalamin Combinations (Neuriva Plus) capsule, Take 1 tablet by mouth At Night As Needed., Disp: , Rfl:     Coenzyme Q10-Vitamin E (QUNOL ULTRA COQ10 PO), Take 100 mg by mouth Daily., Disp: , Rfl:     diclofenac (VOLTAREN) 50 MG EC tablet, Take 1 tablet by mouth 2 (Two) Times a Day As Needed., Disp: , Rfl:     dicyclomine (BENTYL) 20 MG tablet, Take 1 tablet by mouth 3 (Three) Times a Day., Disp: 270 tablet, Rfl: 2    docusate calcium (SURFAK) 240 MG capsule, Take 1 capsule by mouth 2 (Two) Times a Day As Needed., Disp: , Rfl:     fluticasone (FLONASE) 50 MCG/ACT nasal spray, 2 sprays into the nostril(s) as directed by provider As Needed. Administer 2 sprays in each nostril for each dose., Disp: , Rfl:     levothyroxine (SYNTHROID, LEVOTHROID) 88 MCG tablet, TAKE 1 TABLET EVERY DAY, Disp: 60 tablet, Rfl: 2    Melatonin 10 MG capsule, Take 1 capsule by mouth Every Night., Disp: , Rfl:     meloxicam (MOBIC) 15 MG tablet, TAKE 1 TABLET EVERY DAY, Disp: 90 tablet, Rfl: 0    multivitamin with minerals tablet tablet, Take 1  "tablet by mouth Daily., Disp: , Rfl:     pantoprazole (PROTONIX) 40 MG EC tablet, TAKE 1 TABLET TWICE DAILY, Disp: 120 tablet, Rfl: 0    tadalafil (Cialis) 5 MG tablet, Take 1 tablet by mouth Daily., Disp: 30 tablet, Rfl: 11    vitamin D (CHOLECALCIFEROL) 400 UNITS tablet, Take 1 tablet by mouth Daily., Disp: , Rfl:     Allergies:   Allergies   Allergen Reactions    Lipitor [Atorvastatin] Other (See Comments)     MAKES HIM SORE AND STIFF        Objective     Physical Exam:   Vital Signs:   Vitals:    12/06/23 1303   BP: 120/84   BP Location: Left arm   Patient Position: Sitting   Cuff Size: Adult   Weight: 70 kg (154 lb 6.4 oz)   Height: 177.8 cm (70\")     Body mass index is 22.15 kg/m².     Constitutional: NAD, WDWN.   Neurological: A + O x 3  Psych: Normal mood and affec    Labs  Hematocrit (%)   Date Value   07/20/2023 40.1   01/18/2023 41.6   10/18/2022 39.8       No results found for: \"PSA\"    PVR  Post-void residual performed with ultrasound scanner by staff and interpreted by me - 0ml      IPSS Questionnaire (AUA-7):  Over the past month…    1)  Incomplete Emptying:       How often have you had a sensation of not emptying you had the sensation of not emptying your bladder completely after you finished urinating?  2 - Less than half the time   2)  Frequency:       How often have you had the urinate again less than two hours after you finished urinating?  3 - About half the time   3)  Intermittency:       How often have you found you stopped and started again several times when you urinated?   2 - Less than half the time   4) Urgency:      How often have you found it difficult to postpone urination?  3 - About half the time   5) Weak Stream:      How often have you had a weak urinary stream?  2 - Less than half the time   6) Straining:       How often have you had to push or strain to begin urination?  1 - Less than 1 time in 5   7) Nocturia:      How many times did you most typically get up to urinate from the " time you went to bed at night until the time you got up in the morning?  2 - 2 times   Total Score:  15   The International Prostate Symptom Score (IPSS) is used to screen, diagnose, track symptoms of benign prostatic hyperplasia (BPH).   0-7 (Mild Symptoms) 8-19 (Moderate) 20-35 (Severe)   Quality of Life (QoL):  If you were to spend the rest of your life with your urinary condition just the way it is now, how would you feel about that? 3-Mixed   Urine Leakage (Incontinence) 0-No Leakage           Assessment / Plan      Assessment  Mr. Sherman is a 68 y.o. male with erectile dysfunction and LUTS.  Risk factors include patient age former smoker  We discussed BPH and bladder health, treatment options including medical management versus surgical management, patient at this time is most interested in trial of medical management and will consider BPH workup at next visit.    Plan  1.   I discussed treatment options including oral PDE5- medication, intra-urethral suppositories, pharmacologic injections, vacuum erection devices and implantable penile prostheses.    2. The patient was educated about Cialis.  He was counseled on appropriate use, timing and the need for sexual stimulation.  In addition, he understands not to use this medication with nitrates. He was instructed to take the medication about 1 hour prior to sexual activity.  Side effects were explained to the patient such as headache, visual changes, upset stomach, and back pain. Lastly, he was instructed to go immediately to his nearest emergency room in the event he developed an erection lasting longer than 3 hours. He voiced understanding of all these instructions and the importance of seeking prompt medical attention for an erection lasting longer than 3 hours.     3. We also discussed association of erectile dysfunction and future coronary events. Erectile dysfunction can be a marker for future coronary events and usually precedes by 2-4 years.  Recommended  he discuss EKG and possible stress test with his PCP and following his cholesterol.      4.  Start low-dose Cialis daily for BPH and can slowly titrate dosage 1 hour prior to intercourse for ED do not take more than 3 tablets prior to intercourse.    5.  BPH education pamphlet given to patient will further discuss need for BPH workup at next appointment.    Follow Up:   Return in about 4 weeks (around 1/3/2024) for Follow up Tracee.    DIANA Coppola, NP-C  [facility] [place]

## 2024-01-03 ENCOUNTER — OFFICE VISIT (OUTPATIENT)
Dept: UROLOGY | Facility: CLINIC | Age: 69
End: 2024-01-03
Payer: MEDICARE

## 2024-01-03 VITALS
HEIGHT: 70 IN | WEIGHT: 154 LBS | SYSTOLIC BLOOD PRESSURE: 122 MMHG | DIASTOLIC BLOOD PRESSURE: 68 MMHG | BODY MASS INDEX: 22.05 KG/M2

## 2024-01-03 DIAGNOSIS — N40.0 BENIGN PROSTATIC HYPERPLASIA, UNSPECIFIED WHETHER LOWER URINARY TRACT SYMPTOMS PRESENT: Primary | ICD-10-CM

## 2024-01-03 DIAGNOSIS — N52.9 ERECTILE DYSFUNCTION, UNSPECIFIED ERECTILE DYSFUNCTION TYPE: ICD-10-CM

## 2024-01-03 PROCEDURE — 99213 OFFICE O/P EST LOW 20 MIN: CPT | Performed by: NURSE PRACTITIONER

## 2024-01-03 PROCEDURE — 1159F MED LIST DOCD IN RCRD: CPT | Performed by: NURSE PRACTITIONER

## 2024-01-03 PROCEDURE — 1160F RVW MEDS BY RX/DR IN RCRD: CPT | Performed by: NURSE PRACTITIONER

## 2024-01-03 NOTE — PROGRESS NOTES
Office Visit Established Male Patient     Patient Name: Juan Sherman  : 1955   MRN: [id number]     Chief Complaint:   Chief Complaint   Patient presents with    Follow-up     4 week follow up Erectile dysfunction  BPH       History of Present Illness: Mr. Juan Sherman is a 68 y.o. male who presents today for follow up with BPH and ED.  Patient reports daily low-dose Cialis has improved urinary symptoms, he tried 1 tablet prior to intercourse but did not take this on an empty stomach it improved his erections some but not was not satisfactory      Subjective      Review of System:   As noted in HPI    Past Medical History:   Past Medical History:   Diagnosis Date    Arthritis     Disease of thyroid gland     Elevated cholesterol     GERD (gastroesophageal reflux disease)     Gout     Hyperlipidemia     Hypertension     MRSA (methicillin resistant Staphylococcus aureus)     of foot 4 years ago       Past Surgical History:   Past Surgical History:   Procedure Laterality Date    CHOLECYSTECTOMY      COLONOSCOPY      over 10 years ago    COLONOSCOPY N/A 3/28/2023    Procedure: COLONOSCOPY with biopsy;  Surgeon: Jazmine Muñiz MD;  Location: [facility] ENDOSCOPY;  Service: Gastroenterology;  Laterality: N/A;    ERCP      FOOT IRRIGATION, DEBRIDEMENT AND REPAIR      HYDROCELECTOMY      UPPER GASTROINTESTINAL ENDOSCOPY         Family History:   Family History   Problem Relation Age of Onset    Diabetes Mother     Heart attack Mother     Diabetes Sister     Cancer Sister     Arthritis Sister     Diabetes Brother     Colon cancer Brother         diagnosed in his 50-60's    Colon cancer Paternal Uncle         2-3 uncles       Social History:   Social History     Socioeconomic History    Marital status:    Tobacco Use    Smoking status: Former     Packs/day: 0.50     Years: 3.00     Additional pack years: 0.00     Total pack years: 1.50     Types: Cigarettes     Quit date: 1976      Years since quittin.1     Passive exposure: Never    Smokeless tobacco: Never   Vaping Use    Vaping Use: Never used   Substance and Sexual Activity    Alcohol use: Yes     Alcohol/week: 1.0 standard drink of alcohol     Types: 1 Cans of beer per week     Comment: occasionally    Drug use: No    Sexual activity: Defer       Medications:     Current Outpatient Medications:     allopurinol (ZYLOPRIM) 100 MG tablet, Take 1 tablet by mouth 2 (Two) Times a Day., Disp: 180 tablet, Rfl: 2    Cobalamin Combinations (Neuriva Plus) capsule, Take 1 tablet by mouth At Night As Needed., Disp: , Rfl:     Coenzyme Q10-Vitamin E (QUNOL ULTRA COQ10 PO), Take 100 mg by mouth Daily., Disp: , Rfl:     diclofenac (VOLTAREN) 50 MG EC tablet, Take 1 tablet by mouth 2 (Two) Times a Day As Needed., Disp: , Rfl:     dicyclomine (BENTYL) 20 MG tablet, Take 1 tablet by mouth 3 (Three) Times a Day., Disp: 270 tablet, Rfl: 2    docusate calcium (SURFAK) 240 MG capsule, Take 1 capsule by mouth 2 (Two) Times a Day As Needed., Disp: , Rfl:     fluticasone (FLONASE) 50 MCG/ACT nasal spray, 2 sprays into the nostril(s) as directed by provider As Needed. Administer 2 sprays in each nostril for each dose., Disp: , Rfl:     levothyroxine (SYNTHROID, LEVOTHROID) 88 MCG tablet, TAKE 1 TABLET EVERY DAY, Disp: 60 tablet, Rfl: 2    Melatonin 10 MG capsule, Take 1 capsule by mouth Every Night., Disp: , Rfl:     meloxicam (MOBIC) 15 MG tablet, TAKE 1 TABLET EVERY DAY, Disp: 90 tablet, Rfl: 0    multivitamin with minerals tablet tablet, Take 1 tablet by mouth Daily., Disp: , Rfl:     pantoprazole (PROTONIX) 40 MG EC tablet, TAKE 1 TABLET TWICE DAILY, Disp: 120 tablet, Rfl: 0    tadalafil (Cialis) 5 MG tablet, Take 1 tablet by mouth Daily., Disp: 30 tablet, Rfl: 11    vitamin D (CHOLECALCIFEROL) 400 UNITS tablet, Take 1 tablet by mouth Daily., Disp: , Rfl:     Allergies:   Allergies   Allergen Reactions    Lipitor [Atorvastatin] Other (See Comments)      "MAKES HIM SORE AND STIFF        Objective     Physical Exam:   Vital Signs:   Vitals:    01/03/24 0942   BP: 122/68   BP Location: Left arm   Patient Position: Sitting   Cuff Size: Adult   Weight: 69.9 kg (154 lb)   Height: 177.8 cm (70\")     Body mass index is 22.1 kg/m².     Physical Exam  Constitutional: NAD, WDWN.   Neurological: A + O x 3.   Psychiatric:  Normal mood and affect    Labs  Brief Urine Lab Results  (Last result in the past 365 days)        Color   Clarity   Blood   Leuk Est   Nitrite   Protein   CREAT   Urine HCG        12/06/23 1428 Yellow   Clear   Negative   Negative   Negative   Negative                   Lab Results   Component Value Date    GLUCOSE 102 (H) 07/20/2023    CALCIUM 9.9 07/20/2023     07/20/2023    K 4.4 07/20/2023    CO2 27.5 07/20/2023     07/20/2023    BUN 27 (H) 07/20/2023    CREATININE 1.53 (H) 07/20/2023    EGFRIFAFRI 66 03/25/2023    BCR 17.6 07/20/2023    ANIONGAP 8.9 01/18/2023       Lab Results   Component Value Date    WBC 4.56 07/20/2023    HGB 13.7 07/20/2023    HCT 40.1 07/20/2023    MCV 93.7 07/20/2023     07/20/2023            Radiographic Studies  No Images in the past 120 days found..    I have reviewed the above labs and imaging.     Assessment / Plan      Assessment/Plan:   Diagnoses and all orders for this visit:    1. Benign prostatic hyperplasia, unspecified whether lower urinary tract symptoms present (Primary)    2. Erectile dysfunction, unspecified erectile dysfunction type    68-year-old male with a history of BPH and ED has trialed low-dose Cialis for 30 consecutive days this has improved urinary symptoms.  We had an in-depth discussion about proper use of Cialis prior to intercourse which includes taking on an empty stomach with no alcohol.  We also discussed patient can take up to 3 Cialis 1 hour prior to intercourse this would give him his maximum dose of 20 mg in 1 day.  Patient voiced understanding would like to trial using " Cialis for ED appropriately and will return if he has no improvements with increasing dosage slowly.  If patient does not achieve satisfactory erection with maximum dose of Cialis I recommend alprostadil gel or Trimix as our next step.    Continue Cialis 5 mg daily  Can titrate 1 to 3 tablets of Cialis 1 hour prior to intercourse    Follow Up:   Return in about 6 months (around 7/3/2024).    DIANA Coppola,NP-C  [facility] [place] [facility] [place]

## 2024-07-12 ENCOUNTER — OFFICE VISIT (OUTPATIENT)
Dept: UROLOGY | Facility: CLINIC | Age: 69
End: 2024-07-12
Payer: MEDICARE

## 2024-07-12 VITALS
WEIGHT: 153 LBS | OXYGEN SATURATION: 98 % | RESPIRATION RATE: 17 BRPM | DIASTOLIC BLOOD PRESSURE: 84 MMHG | HEART RATE: 64 BPM | HEIGHT: 70 IN | SYSTOLIC BLOOD PRESSURE: 130 MMHG | BODY MASS INDEX: 21.9 KG/M2

## 2024-07-12 DIAGNOSIS — N52.9 ERECTILE DYSFUNCTION, UNSPECIFIED ERECTILE DYSFUNCTION TYPE: ICD-10-CM

## 2024-07-12 DIAGNOSIS — N40.0 BENIGN PROSTATIC HYPERPLASIA, UNSPECIFIED WHETHER LOWER URINARY TRACT SYMPTOMS PRESENT: Primary | ICD-10-CM

## 2024-07-12 RX ORDER — TRAMADOL HYDROCHLORIDE 50 MG/1
TABLET ORAL
COMMUNITY
Start: 2024-06-19

## 2024-07-12 RX ORDER — SIMETHICONE 125 MG
125 TABLET,CHEWABLE ORAL EVERY 6 HOURS PRN
COMMUNITY

## 2024-07-12 RX ORDER — OXYCODONE HYDROCHLORIDE AND ACETAMINOPHEN 5; 325 MG/1; MG/1
1 TABLET ORAL
COMMUNITY
Start: 2024-04-10

## 2024-07-12 RX ORDER — ASPIRIN 81 MG/1
81 TABLET, CHEWABLE ORAL
COMMUNITY

## 2024-07-12 NOTE — PROGRESS NOTES
Office Visit Established Male Patient     Patient Name: Juan Sherman  : 1955   MRN: [id number]     Chief Complaint:   Chief Complaint   Patient presents with    Follow-up     6 month follow up        History of Present Illness: Mr. Juan Sherman is a 68 y.o. male who presents today for follow up with ED.       Subjective      Review of System:   As noted in HPI    Past Medical History:   Past Medical History:   Diagnosis Date    Arthritis     Disease of thyroid gland     Elevated cholesterol     GERD (gastroesophageal reflux disease)     Gout     Hyperlipidemia     Hypertension     MRSA (methicillin resistant Staphylococcus aureus)     of foot 4 years ago       Past Surgical History:   Past Surgical History:   Procedure Laterality Date    CHOLECYSTECTOMY      COLONOSCOPY      over 10 years ago    COLONOSCOPY N/A 3/28/2023    Procedure: COLONOSCOPY with biopsy;  Surgeon: Jazmine Muñiz MD;  Location: [facility] ENDOSCOPY;  Service: Gastroenterology;  Laterality: N/A;    ERCP      FOOT IRRIGATION, DEBRIDEMENT AND REPAIR      HYDROCELECTOMY      UPPER GASTROINTESTINAL ENDOSCOPY         Family History:   Family History   Problem Relation Age of Onset    Diabetes Mother     Heart attack Mother     Diabetes Sister     Cancer Sister     Arthritis Sister     Diabetes Brother     Colon cancer Brother         diagnosed in his 50-60's    Colon cancer Paternal Uncle         2-3 uncles       Social History:   Social History     Socioeconomic History    Marital status:    Tobacco Use    Smoking status: Former     Current packs/day: 0.00     Average packs/day: 0.5 packs/day for 3.0 years (1.5 ttl pk-yrs)     Types: Cigarettes     Start date: 1973     Quit date: 1976     Years since quittin.6     Passive exposure: Never    Smokeless tobacco: Never   Vaping Use    Vaping status: Never Used   Substance and Sexual Activity    Alcohol use: Yes     Alcohol/week: 1.0 standard  drink of alcohol     Types: 1 Cans of beer per week     Comment: occasionally    Drug use: No    Sexual activity: Defer       Medications:     Current Outpatient Medications:     oxyCODONE-acetaminophen (PERCOCET) 5-325 MG per tablet, Take 1 tablet by mouth., Disp: , Rfl:     traMADol (ULTRAM) 50 MG tablet, TAKE 1 TABLET BY MOUTH EVERY 6 HOURS AS NEEDED FOR PAIN. MAX DAILY AMOUNT: 200MG, Disp: , Rfl:     allopurinol (ZYLOPRIM) 100 MG tablet, Take 1 tablet by mouth 2 (Two) Times a Day., Disp: 180 tablet, Rfl: 2    aspirin 81 MG chewable tablet, Chew 1 tablet., Disp: , Rfl:     co-enzyme Q-10 30 MG capsule, Take 100 mg by mouth., Disp: , Rfl:     Cobalamin Combinations (Neuriva Plus) capsule, Take 1 tablet by mouth At Night As Needed., Disp: , Rfl:     Coenzyme Q10-Vitamin E (QUNOL ULTRA COQ10 PO), Take 100 mg by mouth Daily., Disp: , Rfl:     diclofenac (VOLTAREN) 50 MG EC tablet, Take 1 tablet by mouth 2 (Two) Times a Day As Needed., Disp: , Rfl:     dicyclomine (BENTYL) 20 MG tablet, Take 1 tablet by mouth 3 (Three) Times a Day., Disp: 270 tablet, Rfl: 2    docusate calcium (SURFAK) 240 MG capsule, Take 1 capsule by mouth 2 (Two) Times a Day As Needed., Disp: , Rfl:     fluticasone (FLONASE) 50 MCG/ACT nasal spray, 2 sprays into the nostril(s) as directed by provider As Needed. Administer 2 sprays in each nostril for each dose., Disp: , Rfl:     levothyroxine (SYNTHROID, LEVOTHROID) 88 MCG tablet, TAKE 1 TABLET EVERY DAY, Disp: 60 tablet, Rfl: 2    Melatonin 10 MG capsule, Take 1 capsule by mouth Every Night., Disp: , Rfl:     meloxicam (MOBIC) 15 MG tablet, TAKE 1 TABLET EVERY DAY, Disp: 90 tablet, Rfl: 0    multivitamin with minerals tablet tablet, Take 1 tablet by mouth Daily., Disp: , Rfl:     pantoprazole (PROTONIX) 40 MG EC tablet, TAKE 1 TABLET TWICE DAILY, Disp: 120 tablet, Rfl: 0    simethicone (MYLICON) 125 MG chewable tablet, Chew 1 tablet Every 6 (Six) Hours As Needed., Disp: , Rfl:     tadalafil  "(Cialis) 5 MG tablet, Take 1 tablet by mouth Daily., Disp: 30 tablet, Rfl: 11    vitamin D (CHOLECALCIFEROL) 400 UNITS tablet, Take 1 tablet by mouth Daily., Disp: , Rfl:     Allergies:   Allergies   Allergen Reactions    Lipitor [Atorvastatin] Other (See Comments)     MAKES HIM SORE AND STIFF        Objective     Physical Exam:   Vital Signs:   Vitals:    07/12/24 0840   BP: 130/84   BP Location: Left arm   Patient Position: Sitting   Cuff Size: Adult   Pulse: 64   Resp: 17   SpO2: 98%   Weight: 69.4 kg (153 lb)   Height: 177.8 cm (70\")     Body mass index is 21.95 kg/m².     Physical Exam  Vitals and nursing note reviewed.   Constitutional:       General: He is not in acute distress.     Appearance: Normal appearance. He is normal weight. He is not ill-appearing.   Pulmonary:      Effort: Pulmonary effort is normal. No respiratory distress.   Skin:     General: Skin is warm and dry.   Neurological:      General: No focal deficit present.      Mental Status: He is alert and oriented to person, place, and time.   Psychiatric:         Mood and Affect: Mood normal.         Behavior: Behavior normal.        Labs  Brief Urine Lab Results  (Last result in the past 365 days)        Color   Clarity   Blood   Leuk Est   Nitrite   Protein   CREAT   Urine HCG        12/06/23 1428 Yellow   Clear   Negative   Negative   Negative   Negative                   Lab Results   Component Value Date    GLUCOSE 102 (H) 07/20/2023    CALCIUM 9.9 07/20/2023     07/20/2023    K 4.4 07/20/2023    CO2 27.5 07/20/2023     07/20/2023    BUN 27 (H) 07/20/2023    CREATININE 1.53 (H) 07/20/2023    EGFRIFAFRI 66 03/25/2023    BCR 17.6 07/20/2023    ANIONGAP 8.9 01/18/2023       Lab Results   Component Value Date    WBC 4.56 07/20/2023    HGB 13.7 07/20/2023    HCT 40.1 07/20/2023    MCV 93.7 07/20/2023     07/20/2023            Radiographic Studies  No Images in the past 120 days found..    I have reviewed the above labs and " imaging.     Assessment / Plan      Assessment/Plan:   Diagnoses and all orders for this visit:    1. Benign prostatic hyperplasia, unspecified whether lower urinary tract symptoms present (Primary)    2. Erectile dysfunction, unspecified erectile dysfunction type    68-year-old male here to follow-up with BPH and ED after a trial of low-dose Cialis 5 mg daily, his urinary symptoms have improved for him and he is satisfied with medication management.  With maximum dose of 20 mg daily Cialis he continues to have difficulty with erections they are not firm enough for penetration.  We discussed other treatment options including intraurethral gels, CIC, and implants.  Patient has decided at this time he does not want further treatment of ED and will call if he changes his mind.  He also reported concerns of Shameka's disease we discussed ultimate treatment for ED and Peyronie's would be an IPP patient is not interested at this time.    Continue Cialis 5 mg daily         -PSA 10/2023 0.3 repeat 1 year  Patient will call if he decides he is interested in other treatment options    Follow Up:   Return in about 1 year (around 7/12/2025) for Follow up DIANA Momni,NP-C  [facility] [place] [facility] [place]

## 2024-11-25 DIAGNOSIS — N40.0 BENIGN PROSTATIC HYPERPLASIA, UNSPECIFIED WHETHER LOWER URINARY TRACT SYMPTOMS PRESENT: ICD-10-CM

## 2024-11-25 DIAGNOSIS — N52.9 ERECTILE DYSFUNCTION, UNSPECIFIED ERECTILE DYSFUNCTION TYPE: ICD-10-CM

## 2024-11-25 RX ORDER — TADALAFIL 5 MG/1
5 TABLET ORAL DAILY
Qty: 30 TABLET | Refills: 2 | Status: SHIPPED | OUTPATIENT
Start: 2024-11-25

## 2025-07-08 DIAGNOSIS — N40.0 BENIGN PROSTATIC HYPERPLASIA, UNSPECIFIED WHETHER LOWER URINARY TRACT SYMPTOMS PRESENT: Primary | ICD-10-CM

## 2025-07-10 ENCOUNTER — LAB (OUTPATIENT)
Dept: LAB | Facility: HOSPITAL | Age: 70
End: 2025-07-10
Payer: MEDICARE

## 2025-07-10 PROCEDURE — 84153 ASSAY OF PSA TOTAL: CPT | Performed by: NURSE PRACTITIONER

## 2025-07-15 ENCOUNTER — OFFICE VISIT (OUTPATIENT)
Dept: UROLOGY | Facility: CLINIC | Age: 70
End: 2025-07-15
Payer: MEDICARE

## 2025-07-15 VITALS
BODY MASS INDEX: 21.9 KG/M2 | HEART RATE: 78 BPM | DIASTOLIC BLOOD PRESSURE: 76 MMHG | TEMPERATURE: 98.4 F | SYSTOLIC BLOOD PRESSURE: 130 MMHG | OXYGEN SATURATION: 98 % | WEIGHT: 153 LBS | HEIGHT: 70 IN

## 2025-07-15 DIAGNOSIS — N48.6 PEYRONIE'S DISEASE: ICD-10-CM

## 2025-07-15 DIAGNOSIS — R10.31 GROIN PAIN, RIGHT: ICD-10-CM

## 2025-07-15 DIAGNOSIS — N52.9 ERECTILE DYSFUNCTION, UNSPECIFIED ERECTILE DYSFUNCTION TYPE: ICD-10-CM

## 2025-07-15 DIAGNOSIS — R39.9 LOWER URINARY TRACT SYMPTOMS (LUTS): Primary | ICD-10-CM

## 2025-07-15 LAB
BILIRUB BLD-MCNC: NEGATIVE MG/DL
CLARITY, POC: CLEAR
COLOR UR: YELLOW
EXPIRATION DATE: ABNORMAL
GLUCOSE UR STRIP-MCNC: NEGATIVE MG/DL
KETONES UR QL: NEGATIVE
LEUKOCYTE EST, POC: NEGATIVE
Lab: ABNORMAL
NITRITE UR-MCNC: NEGATIVE MG/ML
PH UR: 6 [PH] (ref 5–8)
PROT UR STRIP-MCNC: NEGATIVE MG/DL
RBC # UR STRIP: ABNORMAL /UL
SP GR UR: 1.02 (ref 1–1.03)
UROBILINOGEN UR QL: NORMAL

## 2025-07-15 PROCEDURE — 1159F MED LIST DOCD IN RCRD: CPT | Performed by: NURSE PRACTITIONER

## 2025-07-15 PROCEDURE — 1160F RVW MEDS BY RX/DR IN RCRD: CPT | Performed by: NURSE PRACTITIONER

## 2025-07-15 PROCEDURE — 81003 URINALYSIS AUTO W/O SCOPE: CPT | Performed by: NURSE PRACTITIONER

## 2025-07-15 PROCEDURE — 99214 OFFICE O/P EST MOD 30 MIN: CPT | Performed by: NURSE PRACTITIONER

## 2025-07-15 PROCEDURE — G2211 COMPLEX E/M VISIT ADD ON: HCPCS | Performed by: NURSE PRACTITIONER

## 2025-07-15 RX ORDER — LEVOTHYROXINE SODIUM 100 UG/1
TABLET ORAL
COMMUNITY

## 2025-07-15 RX ORDER — POLYETHYLENE GLYCOL 3350 17 G/17G
17 POWDER, FOR SOLUTION ORAL DAILY
COMMUNITY

## 2025-07-15 NOTE — PROGRESS NOTES
Office Visit     Patient Name: Juan Sherman  : 1955   MRN: [id number]     Patient or patient representative verbalized consent for the use of Ambient Listening during the visit with  DIANA Harry for chart documentation. 7/15/2025  08:55 EDT    Chief Complaint:   Chief Complaint   Patient presents with    Follow-up     Benign prostatic hyperplasia, unspecified whether lower urinary tract symptoms present         Referring Provider: No ref. provider found    Primary Care Provider: Haleigh Begum APRN     History of Present Illness  The patient presents for testicular pain.    Testicular Pain  - Discontinued Cialis on 2025 due to intermittent right testicular pain, occasionally with a sensation of pressure radiating into the right groin.  - Reports right testicle appears to be shrinking.  - Ultrasound in 2023 revealed a 10 mm left epididymal cyst and small right hydrocele.  - Pain exacerbated when sitting, localized to the right side.  - No sexual intercourse due to Peyronie's disease patient and with are not interested in treatment  - Unable to take NSAIDs, tolerates Tylenol.  - History of left testicle hydrocele surgery.    No sensation of incomplete bladder emptying over the past month.    PSA level 0.273, within normal range.    PAST SURGICAL HISTORY:  Surgery for left testicle hydrocele several years ago.       IPSS Questionnaire (AUA-7):  Incomplete emptying  Over the past month, how often have you had a sensation of not emptying your bladder completely after you finished urinating?: Not at all (07/15/25 0859)  Frequency  Over the past month, how often have you had to urinate again less than two hours after you finishied urinating ?: About half the time (07/15/25 0859)  Intermittency  Over the past month, how often have you found you stopped and started again several time when you urinated ?: Not at all (07/15/25 0859)  Urgency  Over the last month, how often have  you found it difficult  have you found it difficult to postpone urination ?: About half the time (07/15/25 0859)  Weak Stream  Over the past month, how often have you had a weak urinary stream ?: Less than half the time (07/15/25 0859)  Straining  Over the past month, how often have you had to push or strain to begin urination ?: Not at all (07/15/25 0859)  Nocturia  Over the past month, how many times did you most typically get up to urinate from the time you went to bed until the time you got up in the morning ?: 1 time (07/15/25 0859)  Quality of life due to urinary symptoms  If you were to spend the rest of your life with your urinary condition the way it is now, how would feel about that?: Pleased (07/15/25 0859)    Scores  Total IPSS Score: (!) 9 (07/15/25 0859)        Subjective   Review of System:   As noted in HPI.    Past Medical History:   Diagnosis Date    Arthritis     Disease of thyroid gland     Elevated cholesterol     GERD (gastroesophageal reflux disease)     Gout     Hyperlipidemia     Hypertension     MRSA (methicillin resistant Staphylococcus aureus)     of foot 4 years ago     Past Surgical History:   Procedure Laterality Date    CHOLECYSTECTOMY      COLONOSCOPY      over 10 years ago    COLONOSCOPY N/A 3/28/2023    Procedure: COLONOSCOPY with biopsy;  Surgeon: Jazmine Muñiz MD;  Location: [facility] ENDOSCOPY;  Service: Gastroenterology;  Laterality: N/A;    ERCP  2021    FOOT IRRIGATION, DEBRIDEMENT AND REPAIR      HYDROCELECTOMY      UPPER GASTROINTESTINAL ENDOSCOPY  2021     Family History   Problem Relation Age of Onset    Diabetes Mother     Heart attack Mother     Diabetes Sister     Cancer Sister     Arthritis Sister     Diabetes Brother     Colon cancer Brother         diagnosed in his 50-60's    Colon cancer Paternal Uncle         2-3 uncles     Social History     Socioeconomic History    Marital status:    Tobacco Use    Smoking status: Former     Current packs/day: 0.00      Average packs/day: 0.5 packs/day for 3.0 years (1.5 ttl pk-yrs)     Types: Cigarettes     Start date: 1973     Quit date: 1976     Years since quittin.6     Passive exposure: Never    Smokeless tobacco: Never   Vaping Use    Vaping status: Never Used   Substance and Sexual Activity    Alcohol use: Yes     Alcohol/week: 1.0 standard drink of alcohol     Types: 1 Cans of beer per week     Comment: occasionally    Drug use: No    Sexual activity: Defer       Current Outpatient Medications:     allopurinol (ZYLOPRIM) 100 MG tablet, Take 1 tablet by mouth 2 (Two) Times a Day., Disp: 180 tablet, Rfl: 2    aspirin 81 MG chewable tablet, Chew 1 tablet., Disp: , Rfl:     co-enzyme Q-10 30 MG capsule, Take 100 mg by mouth., Disp: , Rfl:     Cobalamin Combinations (Neuriva Plus) capsule, Take 1 tablet by mouth At Night As Needed., Disp: , Rfl:     Coenzyme Q10-Vitamin E (QUNOL ULTRA COQ10 PO), Take 100 mg by mouth Daily., Disp: , Rfl:     diclofenac (VOLTAREN) 50 MG EC tablet, Take 1 tablet by mouth 2 (Two) Times a Day As Needed., Disp: , Rfl:     dicyclomine (BENTYL) 20 MG tablet, Take 1 tablet by mouth 3 (Three) Times a Day., Disp: 270 tablet, Rfl: 2    docusate calcium (SURFAK) 240 MG capsule, Take 1 capsule by mouth 2 (Two) Times a Day As Needed., Disp: , Rfl:     fluticasone (FLONASE) 50 MCG/ACT nasal spray, Administer 2 sprays into the nostril(s) as directed by provider As Needed. Administer 2 sprays in each nostril for each dose., Disp: , Rfl:     levothyroxine (SYNTHROID, LEVOTHROID) 100 MCG tablet, TAKE 1 TABLET BY MOUTH ONCE DAILY AT 6AM, Disp: , Rfl:     Melatonin 10 MG capsule, Take 1 capsule by mouth Every Night., Disp: , Rfl:     meloxicam (MOBIC) 15 MG tablet, TAKE 1 TABLET EVERY DAY, Disp: 90 tablet, Rfl: 0    multivitamin with minerals tablet tablet, Take 1 tablet by mouth Daily., Disp: , Rfl:     oxyCODONE-acetaminophen (PERCOCET) 5-325 MG per tablet, Take 1 tablet by mouth., Disp: , Rfl:  "    pantoprazole (PROTONIX) 40 MG EC tablet, TAKE 1 TABLET TWICE DAILY, Disp: 120 tablet, Rfl: 0    polyethylene glycol (MIRALAX) 17 g packet, Take 17 g by mouth Daily., Disp: , Rfl:     simethicone (MYLICON) 125 MG chewable tablet, Chew 1 tablet Every 6 (Six) Hours As Needed., Disp: , Rfl:     tadalafil (CIALIS) 5 MG tablet, Take 1 tablet by mouth once daily, Disp: 30 tablet, Rfl: 2    traMADol (ULTRAM) 50 MG tablet, TAKE 1 TABLET BY MOUTH EVERY 6 HOURS AS NEEDED FOR PAIN. MAX DAILY AMOUNT: 200MG, Disp: , Rfl:     vitamin D (CHOLECALCIFEROL) 400 UNITS tablet, Take 1 tablet by mouth Daily., Disp: , Rfl:     Allergies   Allergen Reactions    Lipitor [Atorvastatin] Other (See Comments)     MAKES HIM SORE AND STIFF      Objective   Visit Vitals  /76 (BP Location: Right arm, Patient Position: Sitting, Cuff Size: Adult)   Pulse 78   Temp 98.4 °F (36.9 °C) (Temporal)   Ht 177.8 cm (70\")   Wt 69.4 kg (153 lb)   SpO2 98%   BMI 21.95 kg/m²        Body mass index is 21.95 kg/m².     Physical Exam  Vitals and nursing note reviewed. Exam conducted with a chaperone present.   Constitutional:       General: He is not in acute distress.     Appearance: Normal appearance. He is normal weight. He is not ill-appearing.   Pulmonary:      Effort: Pulmonary effort is normal. No respiratory distress.   Abdominal:      Hernia: A hernia is present. Hernia is present in the right inguinal area (Small palpable defect in inguinal canal). There is no hernia in the left inguinal area.   Genitourinary:     Penis: Normal and circumcised.       Testes:         Right: Mass, tenderness or swelling not present.         Left: Testicular hydrocele (small) present. Mass, tenderness or swelling not present.      Epididymis:      Right: Normal.      Left: Normal.   Lymphadenopathy:      Lower Body: No right inguinal adenopathy. No left inguinal adenopathy.   Skin:     General: Skin is warm and dry.   Neurological:      General: No focal deficit " "present.      Mental Status: He is alert and oriented to person, place, and time.   Psychiatric:         Mood and Affect: Mood normal.         Behavior: Behavior normal.          Labs  Lab Results   Component Value Date    COLORU Yellow 07/15/2025    CLARITYU Clear 07/15/2025    SPECGRAV 1.020 07/15/2025    PHUR 6.0 07/15/2025    LEUKOCYTESUR Negative 07/15/2025    NITRITE Negative 07/15/2025    PROTEINPOCUA Negative 07/15/2025    GLUCOSEUR Negative 07/15/2025    KETONESU Negative 07/15/2025    UROBILINOGEN Normal 07/15/2025    BILIRUBINUR Negative 07/15/2025    RBCUR Trace (A) 07/15/2025      No results found for: \"WBCUA\", \"RBCUA\", \"BACTERIA\", \"HYALCASTU\", \"SQUAMEPIUA\"     Lab Results   Component Value Date    WBC 4.56 07/20/2023    HGB 13.7 07/20/2023    HCT 40.1 07/20/2023    MCV 93.7 07/20/2023     07/20/2023     Lab Results   Component Value Date    GLUCOSE 102 (H) 07/20/2023    CALCIUM 9.9 07/20/2023     07/20/2023    K 4.4 07/20/2023    CO2 27.5 07/20/2023     07/20/2023    BUN 27 (H) 07/20/2023    BUN 31 (H) 01/18/2023    CREATININE 1.53 (H) 07/20/2023    CREATININE 1.38 (H) 01/18/2023    EGFR 49.5 (L) 07/20/2023    EGFR 56.0 (L) 01/18/2023    BCR 17.6 07/20/2023    ANIONGAP 8.9 01/18/2023    ALT 19 07/20/2023    AST 19 07/20/2023       No results found for: \"HGBA1C\"     Lab Results   Component Value Date    PSA 0.237 07/10/2025       No results found for: \"URICACIDSTN\", \"OWCD4OMVKJA\", \"VBYI4ZHUDO\", \"LABMAGN\"  Lab Results   Component Value Date    WNIB39TE 66.2 10/18/2022     No results found for: \"CYSTINE\", \"URINEVOLUM\", \"CALCIUMUR\", \"OXALATEU\", \"CITRATEUR\", \"LABPH\", \"URICUR\", \"NAUR\", \"KUR\", \"MAGNESIUMUR\", \"PHOSUR\", \"AMMONIUMUR\", \"CLUR\", \"[id number]\", \"UREAUR\", \"LABCREAUR\"    Lab Results   Component Value Date    PSA 0.237 07/10/2025       Lab Results   Component Value Date    FERRITIN 186 07/11/2025    TSH 1.275 07/11/2025    FREET4 0.91 04/10/2025    CMXPHKER78 566 07/11/2025    " [id number] 66.2 10/18/2022         Radiographic Studies  X-ray chest PA and lateral  Result Date: 4/10/2025  No acute cardiopulmonary process. Images reviewed, interpreted, and dictated by Dr. Saad Valentine. Transcribed by Sukumar Chowdhury PA-C      I have reviewed the above labs and imaging.     PVR  Post-void residual performed with ultrasound by staff and interpreted by me - 1 mL    Assessment / Plan      Diagnoses and all orders for this visit:    1. Lower urinary tract symptoms (LUTS) (Primary)  -     POC Urinalysis Dipstick, Automated    2. Groin pain, right  -     US Scrotum & Testicles; Future  -     US Pelvis Complete; Future    3. Peyronie's disease    4. Erectile dysfunction, unspecified erectile dysfunction type         Assessment & Plan  1. Right groin pain  - Intermittent right testicular pain since discontinuing Cialis in 03/2025, exacerbated by sitting, described as 10/10  - Normal testicular exam, left testicle slightly larger, possibly due to past hydrocele  - Order ultrasound to rule out inguinal hernia and confirm normal testicular exam  - Recommend supportive underwear during physical activities  - Tylenol for pain management  - Warm bath for discomfort- Urine Microscopy ordered    2. Peyronie's disease and ED   - Cessation of sexual intercourse due to Peyronie's disease  - Discussed surgical options: penile implant surgery for erectile dysfunction and Peyronie's disease or surgery solely for Peyronie's disease  - Insurance typically covers these treatments  - Patient and wife decline further treatment at this time    3. LUTS  - No significant urinary symptoms since discontinuing Cialis  - PSA level 0.273, well below threshold of concern (<4)  - Consider BPH workup if urinary symptoms become bothersome  - Resuming Cialis is an option if beneficial for urinary symptoms or erectile dysfunction    Follow-up  - Scheduled for 6 weeks from 07/15/2025       Return in about 6 weeks (around 8/26/2025) for  Tracee.    Mary Grace, MSN, APRN, FNP-C  [facility]

## 2025-07-16 LAB
APPEARANCE UR: CLEAR
BACTERIA #/AREA URNS HPF: NORMAL /HPF
BILIRUB UR QL STRIP: NEGATIVE
CASTS URNS MICRO: NORMAL
COLOR UR: YELLOW
EPI CELLS #/AREA URNS HPF: NORMAL /HPF
GLUCOSE UR QL STRIP: NEGATIVE
HGB UR QL STRIP: NEGATIVE
KETONES UR QL STRIP: NEGATIVE
LEUKOCYTE ESTERASE UR QL STRIP: NEGATIVE
NITRITE UR QL STRIP: NEGATIVE
PH UR STRIP: 6 [PH] (ref 5–8)
PROT UR QL STRIP: NEGATIVE
RBC #/AREA URNS HPF: NORMAL /HPF
SP GR UR STRIP: 1.02 (ref 1–1.03)
UROBILINOGEN UR STRIP-MCNC: NORMAL MG/DL
WBC #/AREA URNS HPF: NORMAL /HPF

## (undated) DEVICE — VLV SXN AIR/H2O ORCAPOD3 1P/U STRL

## (undated) DEVICE — Device

## (undated) DEVICE — FRCP BX RADJAW4 NDL 2.8 240 STD OG

## (undated) DEVICE — ENDOSCOPY PORT CONNECTOR FOR OLYMPUS® SCOPES: Brand: ERBE

## (undated) DEVICE — LUBE JELLY PK/2.75GM STRL BX/144

## (undated) DEVICE — HYBRID TUBING/CAP SET FOR OLYMPUS® SCOPES: Brand: ERBE